# Patient Record
Sex: MALE | Race: ASIAN | NOT HISPANIC OR LATINO
[De-identification: names, ages, dates, MRNs, and addresses within clinical notes are randomized per-mention and may not be internally consistent; named-entity substitution may affect disease eponyms.]

---

## 2018-10-31 ENCOUNTER — APPOINTMENT (OUTPATIENT)
Dept: UROLOGY | Facility: CLINIC | Age: 78
End: 2018-10-31
Payer: MEDICARE

## 2018-10-31 VITALS — WEIGHT: 140 LBS | HEIGHT: 64 IN | BODY MASS INDEX: 23.9 KG/M2

## 2018-10-31 DIAGNOSIS — Z86.39 PERSONAL HISTORY OF OTHER ENDOCRINE, NUTRITIONAL AND METABOLIC DISEASE: ICD-10-CM

## 2018-10-31 DIAGNOSIS — R97.20 ELEVATED PROSTATE, SPECIFIC ANTIGEN [PSA]: ICD-10-CM

## 2018-10-31 PROCEDURE — 99204 OFFICE O/P NEW MOD 45 MIN: CPT

## 2019-12-13 ENCOUNTER — INPATIENT (INPATIENT)
Facility: HOSPITAL | Age: 79
LOS: 3 days | Discharge: HOME | DRG: 65 | End: 2019-12-17
Attending: INTERNAL MEDICINE | Admitting: INTERNAL MEDICINE
Payer: MEDICARE

## 2019-12-13 VITALS
DIASTOLIC BLOOD PRESSURE: 98 MMHG | SYSTOLIC BLOOD PRESSURE: 214 MMHG | RESPIRATION RATE: 16 BRPM | WEIGHT: 145.06 LBS | HEART RATE: 79 BPM | HEIGHT: 66 IN | OXYGEN SATURATION: 98 % | TEMPERATURE: 98 F

## 2019-12-13 DIAGNOSIS — J18.9 PNEUMONIA, UNSPECIFIED ORGANISM: ICD-10-CM

## 2019-12-13 DIAGNOSIS — Z29.9 ENCOUNTER FOR PROPHYLACTIC MEASURES, UNSPECIFIED: ICD-10-CM

## 2019-12-13 DIAGNOSIS — G93.40 ENCEPHALOPATHY, UNSPECIFIED: ICD-10-CM

## 2019-12-13 DIAGNOSIS — I10 ESSENTIAL (PRIMARY) HYPERTENSION: ICD-10-CM

## 2019-12-13 DIAGNOSIS — E11.9 TYPE 2 DIABETES MELLITUS WITHOUT COMPLICATIONS: ICD-10-CM

## 2019-12-13 DIAGNOSIS — J11.1 INFLUENZA DUE TO UNIDENTIFIED INFLUENZA VIRUS WITH OTHER RESPIRATORY MANIFESTATIONS: ICD-10-CM

## 2019-12-13 DIAGNOSIS — I63.9 CEREBRAL INFARCTION, UNSPECIFIED: ICD-10-CM

## 2019-12-13 DIAGNOSIS — E78.5 HYPERLIPIDEMIA, UNSPECIFIED: ICD-10-CM

## 2019-12-13 DIAGNOSIS — N18.9 CHRONIC KIDNEY DISEASE, UNSPECIFIED: ICD-10-CM

## 2019-12-13 LAB
ALBUMIN SERPL ELPH-MCNC: 3.6 G/DL — SIGNIFICANT CHANGE UP (ref 3.5–5)
ALP SERPL-CCNC: 90 U/L — SIGNIFICANT CHANGE UP (ref 40–120)
ALT FLD-CCNC: 37 U/L DA — SIGNIFICANT CHANGE UP (ref 10–60)
ANION GAP SERPL CALC-SCNC: 4 MMOL/L — LOW (ref 5–17)
APPEARANCE UR: CLEAR — SIGNIFICANT CHANGE UP
APTT BLD: 30.7 SEC — SIGNIFICANT CHANGE UP (ref 27.5–36.3)
AST SERPL-CCNC: 26 U/L — SIGNIFICANT CHANGE UP (ref 10–40)
BACTERIA # UR AUTO: NEGATIVE /HPF — SIGNIFICANT CHANGE UP
BASOPHILS # BLD AUTO: 0.04 K/UL — SIGNIFICANT CHANGE UP (ref 0–0.2)
BASOPHILS NFR BLD AUTO: 0.5 % — SIGNIFICANT CHANGE UP (ref 0–2)
BILIRUB SERPL-MCNC: 0.5 MG/DL — SIGNIFICANT CHANGE UP (ref 0.2–1.2)
BILIRUB UR-MCNC: NEGATIVE — SIGNIFICANT CHANGE UP
BUN SERPL-MCNC: 30 MG/DL — HIGH (ref 7–18)
CALCIUM SERPL-MCNC: 8.8 MG/DL — SIGNIFICANT CHANGE UP (ref 8.4–10.5)
CHLORIDE SERPL-SCNC: 105 MMOL/L — SIGNIFICANT CHANGE UP (ref 96–108)
CHOLEST SERPL-MCNC: 190 MG/DL — SIGNIFICANT CHANGE UP (ref 10–199)
CK MB BLD-MCNC: 2.7 % — SIGNIFICANT CHANGE UP (ref 0–3.5)
CK MB CFR SERPL CALC: 3.8 NG/ML — HIGH (ref 0–3.6)
CK MB CFR SERPL CALC: 4.2 NG/ML — HIGH (ref 0–3.6)
CK SERPL-CCNC: 142 U/L — SIGNIFICANT CHANGE UP (ref 35–232)
CO2 SERPL-SCNC: 29 MMOL/L — SIGNIFICANT CHANGE UP (ref 22–31)
COLOR SPEC: YELLOW — SIGNIFICANT CHANGE UP
CREAT SERPL-MCNC: 1.43 MG/DL — HIGH (ref 0.5–1.3)
DIFF PNL FLD: NEGATIVE — SIGNIFICANT CHANGE UP
EOSINOPHIL # BLD AUTO: 0.27 K/UL — SIGNIFICANT CHANGE UP (ref 0–0.5)
EOSINOPHIL NFR BLD AUTO: 3.5 % — SIGNIFICANT CHANGE UP (ref 0–6)
EPI CELLS # UR: NEGATIVE /HPF — SIGNIFICANT CHANGE UP
FLU A RESULT: DETECTED
FLU A RESULT: DETECTED
FLUAV AG NPH QL: DETECTED
FLUBV AG NPH QL: SIGNIFICANT CHANGE UP
GLUCOSE BLDC GLUCOMTR-MCNC: 160 MG/DL — HIGH (ref 70–99)
GLUCOSE BLDC GLUCOMTR-MCNC: 96 MG/DL — SIGNIFICANT CHANGE UP (ref 70–99)
GLUCOSE SERPL-MCNC: 132 MG/DL — HIGH (ref 70–99)
GLUCOSE UR QL: NEGATIVE — SIGNIFICANT CHANGE UP
HCT VFR BLD CALC: 47.4 % — SIGNIFICANT CHANGE UP (ref 39–50)
HDLC SERPL-MCNC: 42 MG/DL — SIGNIFICANT CHANGE UP
HGB BLD-MCNC: 15.3 G/DL — SIGNIFICANT CHANGE UP (ref 13–17)
IMM GRANULOCYTES NFR BLD AUTO: 0.1 % — SIGNIFICANT CHANGE UP (ref 0–1.5)
INR BLD: 1.1 RATIO — SIGNIFICANT CHANGE UP (ref 0.88–1.16)
KETONES UR-MCNC: NEGATIVE — SIGNIFICANT CHANGE UP
LEUKOCYTE ESTERASE UR-ACNC: NEGATIVE — SIGNIFICANT CHANGE UP
LIPID PNL WITH DIRECT LDL SERPL: 134 MG/DL — SIGNIFICANT CHANGE UP
LYMPHOCYTES # BLD AUTO: 2.33 K/UL — SIGNIFICANT CHANGE UP (ref 1–3.3)
LYMPHOCYTES # BLD AUTO: 30.2 % — SIGNIFICANT CHANGE UP (ref 13–44)
MAGNESIUM SERPL-MCNC: 2 MG/DL — SIGNIFICANT CHANGE UP (ref 1.6–2.6)
MCHC RBC-ENTMCNC: 29.1 PG — SIGNIFICANT CHANGE UP (ref 27–34)
MCHC RBC-ENTMCNC: 32.3 GM/DL — SIGNIFICANT CHANGE UP (ref 32–36)
MCV RBC AUTO: 90.1 FL — SIGNIFICANT CHANGE UP (ref 80–100)
MONOCYTES # BLD AUTO: 0.97 K/UL — HIGH (ref 0–0.9)
MONOCYTES NFR BLD AUTO: 12.6 % — SIGNIFICANT CHANGE UP (ref 2–14)
NEUTROPHILS # BLD AUTO: 4.1 K/UL — SIGNIFICANT CHANGE UP (ref 1.8–7.4)
NEUTROPHILS NFR BLD AUTO: 53.1 % — SIGNIFICANT CHANGE UP (ref 43–77)
NITRITE UR-MCNC: NEGATIVE — SIGNIFICANT CHANGE UP
NRBC # BLD: 0 /100 WBCS — SIGNIFICANT CHANGE UP (ref 0–0)
NT-PROBNP SERPL-SCNC: 125 PG/ML — SIGNIFICANT CHANGE UP (ref 0–450)
PH UR: 7 — SIGNIFICANT CHANGE UP (ref 5–8)
PLATELET # BLD AUTO: 280 K/UL — SIGNIFICANT CHANGE UP (ref 150–400)
POTASSIUM SERPL-MCNC: 4.1 MMOL/L — SIGNIFICANT CHANGE UP (ref 3.5–5.3)
POTASSIUM SERPL-SCNC: 4.1 MMOL/L — SIGNIFICANT CHANGE UP (ref 3.5–5.3)
PROT SERPL-MCNC: 7.9 G/DL — SIGNIFICANT CHANGE UP (ref 6–8.3)
PROT UR-MCNC: 30 MG/DL
PROTHROM AB SERPL-ACNC: 12.3 SEC — SIGNIFICANT CHANGE UP (ref 10–12.9)
RBC # BLD: 5.26 M/UL — SIGNIFICANT CHANGE UP (ref 4.2–5.8)
RBC # FLD: 12.8 % — SIGNIFICANT CHANGE UP (ref 10.3–14.5)
RBC CASTS # UR COMP ASSIST: NEGATIVE /HPF — SIGNIFICANT CHANGE UP (ref 0–2)
RSV RESULT: SIGNIFICANT CHANGE UP
RSV RNA RESP QL NAA+PROBE: SIGNIFICANT CHANGE UP
SODIUM SERPL-SCNC: 138 MMOL/L — SIGNIFICANT CHANGE UP (ref 135–145)
SP GR SPEC: 1 — LOW (ref 1.01–1.02)
TOTAL CHOLESTEROL/HDL RATIO MEASUREMENT: 4.5 RATIO — SIGNIFICANT CHANGE UP (ref 3.4–9.6)
TRIGL SERPL-MCNC: 72 MG/DL — SIGNIFICANT CHANGE UP (ref 10–149)
TROPONIN I SERPL-MCNC: <0.015 NG/ML — SIGNIFICANT CHANGE UP (ref 0–0.04)
TROPONIN I SERPL-MCNC: <0.015 NG/ML — SIGNIFICANT CHANGE UP (ref 0–0.04)
TSH SERPL-MCNC: 0.83 UU/ML — SIGNIFICANT CHANGE UP (ref 0.34–4.82)
UROBILINOGEN FLD QL: NEGATIVE — SIGNIFICANT CHANGE UP
WBC # BLD: 7.72 K/UL — SIGNIFICANT CHANGE UP (ref 3.8–10.5)
WBC # FLD AUTO: 7.72 K/UL — SIGNIFICANT CHANGE UP (ref 3.8–10.5)
WBC UR QL: SIGNIFICANT CHANGE UP /HPF (ref 0–5)

## 2019-12-13 PROCEDURE — 99223 1ST HOSP IP/OBS HIGH 75: CPT | Mod: GC

## 2019-12-13 PROCEDURE — 70498 CT ANGIOGRAPHY NECK: CPT | Mod: 26

## 2019-12-13 PROCEDURE — 71250 CT THORAX DX C-: CPT | Mod: 26

## 2019-12-13 PROCEDURE — 99285 EMERGENCY DEPT VISIT HI MDM: CPT

## 2019-12-13 PROCEDURE — 70551 MRI BRAIN STEM W/O DYE: CPT | Mod: 26

## 2019-12-13 PROCEDURE — 70496 CT ANGIOGRAPHY HEAD: CPT | Mod: 26

## 2019-12-13 PROCEDURE — 93010 ELECTROCARDIOGRAM REPORT: CPT

## 2019-12-13 PROCEDURE — 70450 CT HEAD/BRAIN W/O DYE: CPT | Mod: 26,59

## 2019-12-13 PROCEDURE — 71045 X-RAY EXAM CHEST 1 VIEW: CPT | Mod: 26

## 2019-12-13 PROCEDURE — 99221 1ST HOSP IP/OBS SF/LOW 40: CPT

## 2019-12-13 RX ORDER — CLOPIDOGREL BISULFATE 75 MG/1
75 TABLET, FILM COATED ORAL DAILY
Refills: 0 | Status: DISCONTINUED | OUTPATIENT
Start: 2019-12-13 | End: 2019-12-17

## 2019-12-13 RX ORDER — LOSARTAN/HYDROCHLOROTHIAZIDE 100MG-25MG
1 TABLET ORAL
Qty: 0 | Refills: 0 | DISCHARGE

## 2019-12-13 RX ORDER — NICARDIPINE HYDROCHLORIDE 30 MG/1
5 CAPSULE, EXTENDED RELEASE ORAL
Qty: 40 | Refills: 0 | Status: DISCONTINUED | OUTPATIENT
Start: 2019-12-13 | End: 2019-12-13

## 2019-12-13 RX ORDER — ASPIRIN/CALCIUM CARB/MAGNESIUM 324 MG
325 TABLET ORAL ONCE
Refills: 0 | Status: COMPLETED | OUTPATIENT
Start: 2019-12-13 | End: 2019-12-13

## 2019-12-13 RX ORDER — LABETALOL HCL 100 MG
10 TABLET ORAL ONCE
Refills: 0 | Status: COMPLETED | OUTPATIENT
Start: 2019-12-13 | End: 2019-12-13

## 2019-12-13 RX ORDER — ASPIRIN/CALCIUM CARB/MAGNESIUM 324 MG
81 TABLET ORAL DAILY
Refills: 0 | Status: DISCONTINUED | OUTPATIENT
Start: 2019-12-13 | End: 2019-12-17

## 2019-12-13 RX ORDER — CEFTRIAXONE 500 MG/1
1000 INJECTION, POWDER, FOR SOLUTION INTRAMUSCULAR; INTRAVENOUS EVERY 24 HOURS
Refills: 0 | Status: COMPLETED | OUTPATIENT
Start: 2019-12-13 | End: 2019-12-13

## 2019-12-13 RX ORDER — DEXTROSE 50 % IN WATER 50 %
25 SYRINGE (ML) INTRAVENOUS ONCE
Refills: 0 | Status: DISCONTINUED | OUTPATIENT
Start: 2019-12-13 | End: 2019-12-17

## 2019-12-13 RX ORDER — INSULIN LISPRO 100/ML
VIAL (ML) SUBCUTANEOUS
Refills: 0 | Status: DISCONTINUED | OUTPATIENT
Start: 2019-12-13 | End: 2019-12-17

## 2019-12-13 RX ORDER — AZITHROMYCIN 500 MG/1
500 TABLET, FILM COATED ORAL ONCE
Refills: 0 | Status: COMPLETED | OUTPATIENT
Start: 2019-12-13 | End: 2019-12-13

## 2019-12-13 RX ORDER — ATORVASTATIN CALCIUM 80 MG/1
1 TABLET, FILM COATED ORAL
Qty: 0 | Refills: 0 | DISCHARGE

## 2019-12-13 RX ORDER — METFORMIN HYDROCHLORIDE 850 MG/1
1 TABLET ORAL
Qty: 0 | Refills: 0 | DISCHARGE

## 2019-12-13 RX ORDER — AMLODIPINE BESYLATE 2.5 MG/1
1 TABLET ORAL
Qty: 0 | Refills: 0 | DISCHARGE

## 2019-12-13 RX ORDER — ATORVASTATIN CALCIUM 80 MG/1
40 TABLET, FILM COATED ORAL AT BEDTIME
Refills: 0 | Status: DISCONTINUED | OUTPATIENT
Start: 2019-12-13 | End: 2019-12-17

## 2019-12-13 RX ADMIN — Medication 75 MILLIGRAM(S): at 18:17

## 2019-12-13 RX ADMIN — CEFTRIAXONE 100 MILLIGRAM(S): 500 INJECTION, POWDER, FOR SOLUTION INTRAMUSCULAR; INTRAVENOUS at 16:51

## 2019-12-13 RX ADMIN — AZITHROMYCIN 255 MILLIGRAM(S): 500 TABLET, FILM COATED ORAL at 17:49

## 2019-12-13 RX ADMIN — Medication 10 MILLIGRAM(S): at 13:07

## 2019-12-13 RX ADMIN — Medication 325 MILLIGRAM(S): at 16:04

## 2019-12-13 RX ADMIN — CLOPIDOGREL BISULFATE 75 MILLIGRAM(S): 75 TABLET, FILM COATED ORAL at 18:17

## 2019-12-13 RX ADMIN — ATORVASTATIN CALCIUM 40 MILLIGRAM(S): 80 TABLET, FILM COATED ORAL at 21:58

## 2019-12-13 NOTE — H&P ADULT - NSHPREVIEWOFSYSTEMS_GEN_ALL_CORE
REVIEW OF SYSTEMS:    CONSTITUTIONAL: No weakness, fevers or chills  EYES/ENT: No visual changes;  No vertigo or throat pain   NECK: No pain or stiffness  RESPIRATORY: No cough, wheezing, hemoptysis; No shortness of breath  CARDIOVASCULAR: No chest pain or palpitations  GASTROINTESTINAL: No abdominal or epigastric pain. No nausea, vomiting, or hematemesis; No diarrhea or constipation. No melena or hematochezia.  GENITOURINARY: No dysuria, frequency or hematuria  NEUROLOGICAL: No numbness or weakness  SKIN: No itching, rashes REVIEW OF SYSTEMS:    CONSTITUTIONAL: + difficulty in speech  EYES/ENT: No visual changes;  No vertigo or throat pain   NECK: No pain or stiffness  RESPIRATORY: +cough  CARDIOVASCULAR: No chest pain or palpitations  GASTROINTESTINAL: No abdominal or epigastric pain. No nausea, vomiting, or hematemesis; No diarrhea or constipation. No melena or hematochezia.  GENITOURINARY: No dysuria, frequency or hematuria  NEUROLOGICAL: No numbness or weakness  SKIN: No itching, rashes

## 2019-12-13 NOTE — ED PROVIDER NOTE - CLINICAL SUMMARY MEDICAL DECISION MAKING FREE TEXT BOX
pt pw elevated bp and aphasia and dysarthria. cva vs hypertensive encephalopathy. I read ekg as sinus rhythm with 1st degree av block, rate 65, no st elevation or depression, qtc 409, normal axis, lvh criteria, narrow qrs. pt pw elevated bp and aphasia and dysarthria. cva vs hypertensive encephalopathy. I read ekg as sinus rhythm with 1st degree av block, rate 65, no st elevation or depression, qtc 409, normal axis, lvh criteria, narrow qrs. mri shows cva in the left mca territory. admit to medicine. aspirin therapy. maintain systolic bps as there are provided they do not exceed 220 pt pw elevated bp and aphasia and dysarthria. cva vs hypertensive encephalopathy. I read ekg as sinus rhythm with 1st degree av block, rate 65, no st elevation or depression, qtc 409, normal axis, lvh criteria, narrow qrs. mri shows cva in the left mca territory but the exam seems still more consistent with encephalopathy. either way, not tpa candidate given onset was at 4am. admit to medicine. aspirin therapy. maintain systolic bps as there are provided they do not exceed 220 pt pw elevated bp and aphasia and dysarthria. cva vs hypertensive encephalopathy. I read ekg as sinus rhythm with 1st degree av block, rate 65, no st elevation or depression, qtc 409, normal axis, lvh criteria, narrow qrs. mri shows cva in the left mca territory but the exam seems still more consistent with encephalopathy. either way, not tpa candidate given onset was at 4am. admit to medicine. aspirin therapy. maintain systolic bps as there are provided they do not exceed 220. chest xray consistent with community acquired pneumonia. will culture and start abx.

## 2019-12-13 NOTE — ED PROVIDER NOTE - CARE PLAN
Principal Discharge DX:	Ischemic stroke  Secondary Diagnosis:	Influenza A Principal Discharge DX:	Ischemic stroke  Secondary Diagnosis:	Influenza A  Secondary Diagnosis:	Hypertensive encephalopathy Principal Discharge DX:	Ischemic stroke  Secondary Diagnosis:	Influenza A  Secondary Diagnosis:	Hypertensive encephalopathy  Secondary Diagnosis:	CAP (community acquired pneumonia)

## 2019-12-13 NOTE — H&P ADULT - ATTENDING COMMENTS
Patient is a 79y old  Male who presents with a chief complaint of Aphasia. NIHSS score was 3. Patient was out of TPA window. He reports waking up with slurring of speech. Denies any weakness, numbness, difficulty in swallowing. He recently had dry cough but denies any fever, chills, malaise.     Seen and examined at bedside today  Has slurring of speech     REVIEW OF SYSTEMS: denies fever, chills, chest pain, abdominal pain, nausea, vomiting, diarrhea, constipation, dizziness    PHYSICAL EXAM:  GENERAL: NAD, well-groomed, well-developed  HEAD:  Atraumatic, Normocephalic  NERVOUS SYSTEM:  Alert & Oriented X3, Good concentration; CN grossly intact except slurring of speech. Motor Strength 5/5 B/L upper and lower extremities  CHEST/LUNG: Clear to auscultation bilaterally; No rales, rhonchi, wheezing, or rubs  HEART: Regular rate and rhythm; No murmurs, rubs, or gallops  ABDOMEN: Soft, Nontender, Nondistended; Bowel sounds present  EXTREMITIES:  2+ Peripheral Pulses, No clubbing, cyanosis, or edema  SKIN: No rashes or lesions  LABS:                        15.3   7.72  )-----------( 280      ( 13 Dec 2019 12:15 )             47.4     138  |  105  |  30<H>  ----------------------------<  132<H>  4.1   |  29  |  1.43<H>    Assessment and plan:  1. Acute left MCA cortical infarcts  MRI noted  CTA noted- mild narrowing of left MCA, severe stenosis of left ICA  Continue Aspirin, Plavix for 3 weeks and high intensity statin   Maintain permissive HTN for 48hrs   Spoke with Dr Reynaga   Speech and swallow eval   PT eval     2. Influenza  Cont Tamiflu  CT chest neg for consolidation  Will hold off antibiotics    3. HTN  Hold antihypertensive now- for permissive HTN    4. DM   ISS     5. Diet: Dysphagia diet, Lovenox for DVT prophylaxis, PT eval   Fall precaution Patient is a 79y old  Male who presents with a chief complaint of Aphasia. NIHSS score was 3. Patient was out of TPA window. He reports waking up with slurring of speech. Denies any weakness, numbness, difficulty in swallowing. He recently had dry cough but denies any fever, chills, malaise.     Seen and examined at bedside today  Has slurring of speech     REVIEW OF SYSTEMS: denies fever, chills, chest pain, abdominal pain, nausea, vomiting, diarrhea, constipation, dizziness    PHYSICAL EXAM:  GENERAL: NAD, well-groomed, well-developed  HEAD:  Atraumatic, Normocephalic  NERVOUS SYSTEM:  Alert & Oriented X3, Good concentration; CN grossly intact except slurring of speech. Motor Strength 5/5 B/L upper and lower extremities  CHEST/LUNG: Clear to auscultation bilaterally; No rales, rhonchi, wheezing, or rubs  HEART: Regular rate and rhythm; No murmurs, rubs, or gallops  ABDOMEN: Soft, Nontender, Nondistended; Bowel sounds present  EXTREMITIES:  2+ Peripheral Pulses, No clubbing, cyanosis, or edema  SKIN: No rashes or lesions  LABS:                        15.3   7.72  )-----------( 280      ( 13 Dec 2019 12:15 )             47.4     138  |  105  |  30<H>  ----------------------------<  132<H>  4.1   |  29  |  1.43<H>    Assessment and plan:  1. Acute left MCA cortical infarcts  MRI noted  CTA noted- mild narrowing of left MCA; severe stenosis of left ICA  Continue Aspirin, Plavix for 3 weeks and high intensity statin   No vascular surgery intervention needed emergently- Spoke with Dr Reynaga   Maintain permissive HTN for 48hrs   Cont Tele  TTE   Speech and swallow eval   PT eval     2. Influenza  Cont Tamiflu  CT chest neg for consolidation  Will hold off antibiotics    3. LASHELL  Cont Gentle hydration     4. Left ICA stenosis   Refer to vasc surgery     5. HTN  Hold antihypertensive now- for permissive HTN    6. DM   ISS     7. Diet: Dysphagia diet, Lovenox for DVT prophylaxis, PT eval   Aspiration, Fall and seizure precaution Patient is a 79y old  Male who presents with a chief complaint of Aphasia. NIHSS score was 3. Patient was out of TPA window. He reports waking up with slurring of speech. Denies any weakness, numbness, difficulty in swallowing. He recently had dry cough but denies any fever, chills, malaise.     Seen and examined at bedside today  Has slurring of speech     REVIEW OF SYSTEMS: denies fever, chills, chest pain, abdominal pain, nausea, vomiting, diarrhea, constipation, dizziness    PHYSICAL EXAM:  GENERAL: NAD, well-groomed, well-developed  HEAD:  Atraumatic, Normocephalic  NERVOUS SYSTEM:  Alert & Oriented X3, Good concentration; CN grossly intact except slurring of speech. Motor Strength 5/5 B/L upper and lower extremities  CHEST/LUNG: Clear to auscultation bilaterally; No rales, rhonchi, wheezing, or rubs  HEART: Regular rate and rhythm; No murmurs, rubs, or gallops  ABDOMEN: Soft, Nontender, Nondistended; Bowel sounds present  EXTREMITIES:  2+ Peripheral Pulses, No clubbing, cyanosis, or edema  SKIN: No rashes or lesions  LABS:                        15.3   7.72  )-----------( 280      ( 13 Dec 2019 12:15 )             47.4     138  |  105  |  30<H>  ----------------------------<  132<H>  4.1   |  29  |  1.43<H>    Assessment and plan:  1. Acute left MCA cortical infarcts  MRI noted  CTA noted- mild narrowing of left MCA; severe stenosis of left ICA  Continue Aspirin, Plavix for 3 weeks and high intensity statin   No vascular surgery intervention needed emergently- Spoke with Dr Reynaga   Maintain permissive HTN for 48hrs   Cont Tele  TTE   Speech and swallow eval   PT eval     2. Influenza  Cont Tamiflu  CT chest neg for consolidation  Will hold off antibiotics    3. LASHELL  Cont Gentle hydration     4. Left ICA stenosis   Refer to vasc surgery     5. HTN  Hold antihypertensive now- for permissive HTN    6. DM   ISS     7. Diet: Dysphagia diet, heparin SQ for DVT prophylaxis, PT eval   Aspiration, Fall and seizure precaution

## 2019-12-13 NOTE — H&P ADULT - HISTORY OF PRESENT ILLNESS
Pt is a 79m from home hx htn and dm pw aphasia and dysarthria. pt notes symptoms by his own volition at 4am this morning upon awakening. symptoms have been in and out. pt compliant with amlodipine and losartan and hctz. bp was elevates to the 200s systolic and son, who is er doc, gave additional dosage of those medications. patient denies numbness, focal weakness, cp, vomiting, diaphoresis. endorses cough, recent foreign travel. no fever, chills, rash, bleeding, vision loss, rhinorrhea Pt is a 79m from home with pmh of HTN, DM and hyperlipidemia who came to ED with c/o aphasia. Pt family at bedside giving details of all history as pt is having some speech difficulty. As per son pt started having difficulty in speech rebecca word finding difficulty that started around 4am. Symptoms waxed and waned pt never returned to baseline and was brought to ED. Pt is independent at baseline with no h/o of prior strokes/speech problem. Pt denies any headchae, weakness, chest pain, palpitations change in bowel or urinary habits. Pt also recently travelled and is also having cough past few days but no fever/chills reported  Pt is non smoker and non-alcoholic, lives at home with family, ambulated without any assistance at baseline

## 2019-12-13 NOTE — ED ADULT NURSE NOTE - OBJECTIVE STATEMENT
Slurred speech noted around 4AM with hypertension as per family. Patient A&O x 3 denies weakness, slurred speech at this time.

## 2019-12-13 NOTE — H&P ADULT - PROBLEM SELECTOR PLAN 5
c/w atorvastatin 40mg  f/u lipid profile in am Pt also found to be positive for Flu\  Droplet isolation and c/u tamiflu  No consolidation on CT chest  Holding abx  f/u blood cultures and monitor for fever

## 2019-12-13 NOTE — CONSULT NOTE ADULT - SUBJECTIVE AND OBJECTIVE BOX
HISTORY (from Admission H&P)    History of Present Illness: 	  Pt is a 79m from home with pmh of HTN, DM and hyperlipidemia who came to ED with c/o aphasia. Pt family at bedside giving details of all history as pt is having some speech difficulty. As per son pt started having difficulty in speech rebecca word finding difficulty that started around 4am. Symptoms waxed and waned pt never returned to baseline and was brought to ED. Pt is independent at baseline with no h/o of prior strokes/speech problem. Pt denies any headchae, weakness, chest pain, palpitations change in bowel or urinary habits. Pt also recently travelled and is also having cough past few days but no fever/chills reported  Pt is non smoker and non-alcoholic, lives at home with family, ambulated without any assistance at baseline     Review of Systems:  Review of Systems: REVIEW OF SYSTEMS:   CONSTITUTIONAL: + difficulty in speech  EYES/ENT: No visual changes;  No vertigo or throat pain   NECK: No pain or stiffness  RESPIRATORY: +cough  CARDIOVASCULAR: No chest pain or palpitations  GASTROINTESTINAL: No abdominal or epigastric pain. No nausea, vomiting, or hematemesis; No diarrhea or constipation. No melena or hematochezia.  GENITOURINARY: No dysuria, frequency or hematuria  NEUROLOGICAL: No numbness or weakness SKIN: No itching, rashes	      Allergies and Intolerances:        Allergies:  	No Known Allergies:     Home Medications:   * Patient Currently Takes Medications as of 13-Dec-2019 16:16 documented in Structured Notes  · 	losartan-hydrochlorothiazide 100mg-12.5mg oral tablet: Last Dose Taken:  , 1 tab(s) orally once a day  · 	atorvastatin 20 mg oral tablet: Last Dose Taken:  , 1 tab(s) orally once a day  · 	metFORMIN 500 mg oral tablet: Last Dose Taken:  , 1 tab(s) orally 2 times a day    Patient History:    Past Medical, Past Surgical, and Family History:  PAST MEDICAL HISTORY:  Carotid Artery Disease     Diabetes     HTN (hypertension).     PAST SURGICAL HISTORY:  History of CEA (carotid endarterectomy) 4 years ago on the R.     Social History:  Social History (marital status, living situation, occupation, tobacco use, alcohol and drug use, and sexual history): Non smoker, non alcoholic and no drug use	     Tobacco Screening:  · Core Measure Site	Yes	  · Has the patient used tobacco in the past 30 days?	No	    Risk Assessment:    Present on Admission:  Deep Venous Thrombosis	no	  Pulmonary Embolus	no	     Heart Failure:  Does this patient have a history of or has been diagnosed with heart failure? unknown.  [End of quoted History from Admission H&P]      Pt's sons, both physicians, at bedside.      Additional Hx, from sons.    Before today, Pt fluent in Polish and English, cognitively intact, physically active, walking normally.        EXAMINATION    Awake, alert, recumbent with torso partially elevated, on Gurney. HISTORY (from Admission H&P)    History of Present Illness: 	  Pt is a 79m from home with pmh of HTN, DM and hyperlipidemia who came to ED with c/o aphasia. Pt family at bedside giving details of all history as pt is having some speech difficulty. As per son pt started having difficulty in speech rebecca word finding difficulty that started around 4am. Symptoms waxed and waned pt never returned to baseline and was brought to ED. Pt is independent at baseline with no h/o of prior strokes/speech problem. Pt denies any headchae, weakness, chest pain, palpitations change in bowel or urinary habits. Pt also recently travelled and is also having cough past few days but no fever/chills reported  Pt is non smoker and non-alcoholic, lives at home with family, ambulated without any assistance at baseline     Review of Systems:  Review of Systems: REVIEW OF SYSTEMS:   CONSTITUTIONAL: + difficulty in speech  EYES/ENT: No visual changes;  No vertigo or throat pain   NECK: No pain or stiffness  RESPIRATORY: +cough  CARDIOVASCULAR: No chest pain or palpitations  GASTROINTESTINAL: No abdominal or epigastric pain. No nausea, vomiting, or hematemesis; No diarrhea or constipation. No melena or hematochezia.  GENITOURINARY: No dysuria, frequency or hematuria  NEUROLOGICAL: No numbness or weakness SKIN: No itching, rashes	      Allergies and Intolerances:        Allergies:  	No Known Allergies:     Home Medications:   * Patient Currently Takes Medications as of 13-Dec-2019 16:16 documented in Structured Notes  · 	losartan-hydrochlorothiazide 100mg-12.5mg oral tablet: Last Dose Taken:  , 1 tab(s) orally once a day  · 	atorvastatin 20 mg oral tablet: Last Dose Taken:  , 1 tab(s) orally once a day  · 	metFORMIN 500 mg oral tablet: Last Dose Taken:  , 1 tab(s) orally 2 times a day    Patient History:    Past Medical, Past Surgical, and Family History:  PAST MEDICAL HISTORY:  Carotid Artery Disease     Diabetes     HTN (hypertension).     PAST SURGICAL HISTORY:  History of CEA (carotid endarterectomy) 4 years ago on the R.     Social History:  Social History (marital status, living situation, occupation, tobacco use, alcohol and drug use, and sexual history): Non smoker, non alcoholic and no drug use	     Tobacco Screening:  · Core Measure Site	Yes	  · Has the patient used tobacco in the past 30 days?	No	    Risk Assessment:    Present on Admission:  Deep Venous Thrombosis	no	  Pulmonary Embolus	no	     Heart Failure:  Does this patient have a history of or has been diagnosed with heart failure? unknown.  [End of quoted History from Admission H&P]      Pt's sons, both physicians, at bedside.      Additional Hx, from sons.    Before today, Pt fluent in Syriac and English, cognitively intact, physically active, walking normally.  When son arrived at Pr's home early today  (went because of altered functioning) BP was very high; Pt given anti hypertensive meiucation then taken to ED.      CABG 2013.  R CEA 2009 (had episode of blurry visio; found with >75% stenosis      EXAMINATION    Awake, alert, recumbent with torso partially elevated, on Gurney.  Gives correct date but in fragments; initially gave month as "twelve" and after being queried says December.  Speech is halting, the impression being that he is searching for words. Names only two fingers: the thumb and littl Does not correctly repeat "no ifs ands or buts": despite examiner emphasizing the terminal /s/ sounds Pt does not repeat them.  Gives name of hospital as "[facility]  Does not provide a reason for being here.  Cognitive slowing.    PERRL; visual fields grossly intact to confrontation.  EOMS: does not follow finger or reflex hammer adequately well; follows face - range appears full.      Subtle L ADQ drift on suspension. Mild clumsiness of both hands (asymmetrically so).  Minimal mitgehen and motor perseveration.  ?Weakness of dorsiflexion of L hallux.  Otherwise grossly normal limb motor function.    LT grossly intact face, limbs, to unilateral stimuli and ti DSS.      Graphesthesia intact R fingertip; impaired L fingertips and palm.       Reflex                           Right    Left   Comment    Scapulohumeral               tr       tr  Pectoralis                        0       0  Biceps                            1       1  Triceps                           tr       tr  Fing flex                           0      0  Garcia                      absent  absent  Patellar                           3-      3-  Gastroc                           0       0  Plantar                        flexor   flexor    FNF eyes open/closed, and HKS intact bilat.    Extremely short-stepped gait.  Stable stance, eyes open, w feet  ~6 inches; mildly unsteady after closing lids.      From report of non-con head CT:    TECHNIQUE: CT of the head was performed without IV contrast.    COMPARISON: None.    FINDINGS:  There is no acute intracranial hemorrhage, parenchymal mass, mass effect   or midline shift. There is no hydrocephalus.    Possible hyperdense right MCA sign of the M1 segment of the right middle   cerebral artery and M2 branch on 4-34.    Nonspecific subcentimeter hypodensity left frontal lobe 4-46.    The cranium is intact. The visualized paranasal sinuses are well-aerated.    IMPRESSION:  No acute intracranial hemorrhage.    Nonspecific subcentimeter hypodensity left frontal lobe. Possible right   MCA sign. MRI exam recommended  to exclude acute ischemia.              From report of Head and Neck DTAs:    COMPARISON: None.    FINDINGS:  CTA of the neck:  Mild stenosis origin of right subclavian artery    The common carotid and internal carotid arteries are patent.   Atherosclerotic plaques are noted in the carotid bulbs. Calcified   atherosclerotic plaque noted at the origin of left internal carotid artery    Severe narrowing at the origin of left internal carotid artery.    The extracranial vertebral arteries are patent.    There is medial apposition of the vocal cords. Degenerative changes noted.    CTA Head:  Calcified atherosclerotic plaques cavernous segments and supraclinoid   internal carotid arteries resulting in narrowing.    The proximal anterior, middle and posterior cerebral arteries are patent.    Mild focal narrowing at the distal M1 segment of the right internal   carotid artery. M1 segment of the right middle cerebral artery is patent.    Mild narrowing distal M1 segment of left middle cerebral artery.   Narrowing at the proximal M2 segment of left middle cerebral artery    The intracranial vertebral and basilar arteries are patent. Calcified   atherosclerotic plaque V4 segment left distal vertebral artery resulting   in focal narrowing    There is no intracranial aneurysm.        IMPRESSION:  Severe (greater than 70%) narrowing at the origin of left internal   carotid artery.    Patent M1 segment of the right middle cerebral artery which appeared   hyperdense on the noncontrast exam.    Multifocal intracranial narrowing as described above.        From report of Brain MRI:      There is a tiny strip of sulcal diffusion restriction and matching   cytotoxic edema. It measures roughly 1.4 cm in length with adjacent 2 to   3 mm foci. There is no mass effect or hydrocephalus. There is a cavum   septum. There is mild central volume loss. There is no evidence of   previous parenchymal hemorrhage. The orbital and sellar contents and   cerebellar tonsils are within normal limits.    IMPRESSION:    Tiny acute bland cortical infarcts in distal left MCA branch distribution

## 2019-12-13 NOTE — CONSULT NOTE ADULT - ASSESSMENT
IMPRESSIONS    Imaging findings do not explain neurologic findings.  In particular the R parietal infarct does not account for language disturbance, confusion, etc., but probably explains impaired graphesthesia L hand.       Most likely: acute hypertensive encephalopathy.      Cerebral autoregulation curve most likely shifted therefore would allow some degree of hypertension.  The need here is to balance the need to treat BP sufficiently to manage hypertensive encephalopathy while avoiding risk of hypoperfusion from too agressive lowering of BP.  Recommend target range of 140-175 systolic,

## 2019-12-13 NOTE — H&P ADULT - PROBLEM SELECTOR PLAN 4
Pt also found to be positive for Flu\  Droplet isolation and c/u tamiflu  No consolidation on CT chest  Holding abx  f/u blood cultures and monitor for fever Pt has known CKD  Renal function at baseline

## 2019-12-13 NOTE — ED PROVIDER NOTE - OBJECTIVE STATEMENT
79m hx htn and dm pw aphasia and dysarthria. pt notes symptoms by his own volition at 4am this morning upon awakening. symptoms have been in and out. pt compliant with amlodipine and losartan and hctz. bp was elevates to the 200s systolic and son, who is er doc, gave additional dosage of those medications. patient denies numbness, focal weakness, cp, vomiting, diaphoresis. endorses cough, recent foreign travel. no fever, chills, rash, bleeding, vision loss, rhinorrhea  called in as code stroke  Fh and Sh not otherwise contributory  ROS otherwise negative

## 2019-12-13 NOTE — H&P ADULT - ASSESSMENT
Pt is a 79m from home with pmh of HTN, DM and hyperlipidemia who came to ED with c/o aphasia. Pt family at bedside giving details of all history as pt is having some speech difficulty. As per son pt started having difficulty in speech rebecca word finding difficulty that started around 4am. Symptoms waxed and waned pt never returned to baseline and was brought to ED. Pt is independent at baseline with no h/o of prior strokes/speech problem. Pt denies any headchae, weakness, chest pain, palpitations change in bowel or urinary habits. Pt also recently travelled and is also having cough past few days but no fever/chills reported  Pt is non smoker and non-alcoholic, lives at home with family, ambulated without any assistance at baseline.  In Ed pt vitals were  ICU Vital Signs Last 24 Hrs  T(C): 36.4 (13 Dec 2019 17:30), Max: 37.1 (13 Dec 2019 15:05)  T(F): 97.6 (13 Dec 2019 17:30), Max: 98.7 (13 Dec 2019 15:05)  HR: 64 (13 Dec 2019 17:30) (64 - 88)  BP: 172/658 (13 Dec 2019 17:30) (172/658 - 214/98)  BP(mean): --  ABP: --  ABP(mean): --  RR: 18 (13 Dec 2019 17:30) (16 - 18)  SpO2: 95% (13 Dec 2019 17:30) (95% - 98%)    Code stroke was called, CT head was negative, no tpa was given as pt was outside of window.

## 2019-12-13 NOTE — ED PROVIDER NOTE - PHYSICAL EXAMINATION
Gen: Alert, NAD  Head: NC, AT   Eyes: PERRL, EOMI, normal lids/conjunctiva  ENT: normal hearing, patent oropharynx without erythema/exudate, uvula midline  Neck: supple, no tenderness, Trachea midline  Pulm: Bilateral BS, normal resp effort, no wheeze/stridor/retractions  CV: RRR, no M/R/G, 2+ radial and dp pulses bl, no edema  Abd: soft, NT/ND, +BS, no hepatosplenomegaly  Mskel: extremities x4 with normal ROM and no joint effusions. no ctl spine ttp.   Skin: no rash, no bruising   Neuro: A+A oriented to place, person and time. got the month wrong initially though. has ,moderate aphasia and mild dysarthria. 5/5 motor bl ue and le. no sensory loss. finger to nose and heel to shin intact bl.

## 2019-12-13 NOTE — H&P ADULT - PROBLEM SELECTOR PLAN 3
Pt is on amlodipine and losartn-HCTZ 100/12.5 at home  Holding oral medications for permissive HTN  Monitor for Bp elevation more than 220/110

## 2019-12-14 LAB
24R-OH-CALCIDIOL SERPL-MCNC: 21 NG/ML — LOW (ref 30–80)
ALBUMIN SERPL ELPH-MCNC: 3 G/DL — LOW (ref 3.5–5)
ALP SERPL-CCNC: 72 U/L — SIGNIFICANT CHANGE UP (ref 40–120)
ALT FLD-CCNC: 28 U/L DA — SIGNIFICANT CHANGE UP (ref 10–60)
ANION GAP SERPL CALC-SCNC: 5 MMOL/L — SIGNIFICANT CHANGE UP (ref 5–17)
AST SERPL-CCNC: 24 U/L — SIGNIFICANT CHANGE UP (ref 10–40)
BILIRUB SERPL-MCNC: 0.5 MG/DL — SIGNIFICANT CHANGE UP (ref 0.2–1.2)
BUN SERPL-MCNC: 23 MG/DL — HIGH (ref 7–18)
CALCIUM SERPL-MCNC: 8.3 MG/DL — LOW (ref 8.4–10.5)
CHLORIDE SERPL-SCNC: 104 MMOL/L — SIGNIFICANT CHANGE UP (ref 96–108)
CO2 SERPL-SCNC: 29 MMOL/L — SIGNIFICANT CHANGE UP (ref 22–31)
CREAT SERPL-MCNC: 1.44 MG/DL — HIGH (ref 0.5–1.3)
GLUCOSE BLDC GLUCOMTR-MCNC: 116 MG/DL — HIGH (ref 70–99)
GLUCOSE BLDC GLUCOMTR-MCNC: 142 MG/DL — HIGH (ref 70–99)
GLUCOSE BLDC GLUCOMTR-MCNC: 150 MG/DL — HIGH (ref 70–99)
GLUCOSE BLDC GLUCOMTR-MCNC: 181 MG/DL — HIGH (ref 70–99)
GLUCOSE SERPL-MCNC: 120 MG/DL — HIGH (ref 70–99)
HCT VFR BLD CALC: 44.5 % — SIGNIFICANT CHANGE UP (ref 39–50)
HGB BLD-MCNC: 14.6 G/DL — SIGNIFICANT CHANGE UP (ref 13–17)
MCHC RBC-ENTMCNC: 29.8 PG — SIGNIFICANT CHANGE UP (ref 27–34)
MCHC RBC-ENTMCNC: 32.8 GM/DL — SIGNIFICANT CHANGE UP (ref 32–36)
MCV RBC AUTO: 90.8 FL — SIGNIFICANT CHANGE UP (ref 80–100)
NRBC # BLD: 0 /100 WBCS — SIGNIFICANT CHANGE UP (ref 0–0)
PLATELET # BLD AUTO: 267 K/UL — SIGNIFICANT CHANGE UP (ref 150–400)
POTASSIUM SERPL-MCNC: 4.2 MMOL/L — SIGNIFICANT CHANGE UP (ref 3.5–5.3)
POTASSIUM SERPL-SCNC: 4.2 MMOL/L — SIGNIFICANT CHANGE UP (ref 3.5–5.3)
PROT SERPL-MCNC: 6.8 G/DL — SIGNIFICANT CHANGE UP (ref 6–8.3)
RBC # BLD: 4.9 M/UL — SIGNIFICANT CHANGE UP (ref 4.2–5.8)
RBC # FLD: 12.8 % — SIGNIFICANT CHANGE UP (ref 10.3–14.5)
SODIUM SERPL-SCNC: 138 MMOL/L — SIGNIFICANT CHANGE UP (ref 135–145)
WBC # BLD: 6.67 K/UL — SIGNIFICANT CHANGE UP (ref 3.8–10.5)
WBC # FLD AUTO: 6.67 K/UL — SIGNIFICANT CHANGE UP (ref 3.8–10.5)

## 2019-12-14 PROCEDURE — 99233 SBSQ HOSP IP/OBS HIGH 50: CPT

## 2019-12-14 PROCEDURE — 99232 SBSQ HOSP IP/OBS MODERATE 35: CPT

## 2019-12-14 RX ADMIN — Medication 75 MILLIGRAM(S): at 06:09

## 2019-12-14 RX ADMIN — Medication 81 MILLIGRAM(S): at 11:56

## 2019-12-14 RX ADMIN — ATORVASTATIN CALCIUM 40 MILLIGRAM(S): 80 TABLET, FILM COATED ORAL at 21:58

## 2019-12-14 RX ADMIN — CLOPIDOGREL BISULFATE 75 MILLIGRAM(S): 75 TABLET, FILM COATED ORAL at 11:56

## 2019-12-14 RX ADMIN — Medication 75 MILLIGRAM(S): at 17:30

## 2019-12-14 NOTE — PROGRESS NOTE ADULT - SUBJECTIVE AND OBJECTIVE BOX
NOTE FOR 19    Patient is a 79y old  Male who presents with a chief complaint of Aphasia (15 Dec 2019 10:11)    HPI:  Pt is a 79m from home with pmh of HTN, DM and hyperlipidemia who came to ED with c/o aphasia. Pt family at bedside giving details of all history as pt is having some speech difficulty. As per son pt started having difficulty in speech rebecca word finding difficulty that started around 4am. Symptoms waxed and waned pt never returned to baseline and was brought to ED. Pt is independent at baseline with no h/o of prior strokes/speech problem. Pt denies any headchae, weakness, chest pain, palpitations change in bowel or urinary habits. Pt also recently travelled and is also having cough past few days but no fever/chills reported  Pt is non smoker and non-alcoholic, lives at home with family, ambulated without any assistance at baseline (13 Dec 2019 16:17)    Pt is not in his room.     PAST MEDICAL & SURGICAL HISTORY:  Diabetes  Carotid Artery Disease  HTN (hypertension)  History of CEA (carotid endarterectomy): 4 years ago on the R    MEDICATIONS  (STANDING):  aspirin  chewable 81 milliGRAM(s) Oral daily  atorvastatin 40 milliGRAM(s) Oral at bedtime  clopidogrel Tablet 75 milliGRAM(s) Oral daily  dextrose 50% Injectable 25 Gram(s) IV Push once  dextrose 50% Injectable 25 Gram(s) IV Push once  insulin lispro (HumaLOG) corrective regimen sliding scale   SubCutaneous three times a day before meals  oseltamivir 75 milliGRAM(s) Oral two times a day    MEDICATIONS  (PRN):      EXAM:  Vital Signs Last 24 Hrs  T(C): 36.9 (15 Dec 2019 07:33), Max: 36.9 (14 Dec 2019 11:18)  T(F): 98.5 (15 Dec 2019 07:33), Max: 98.5 (14 Dec 2019 11:18)  HR: 58 (15 Dec 2019 07:33) (56 - 73)  BP: 167/75 (15 Dec 2019 07:33) (160/64 - 179/74)  BP(mean): --  RR: 16 (15 Dec 2019 07:33) (16 - 18)  SpO2: 97% (15 Dec 2019 07:33) (95% - 100%)    - @ 07:01  -  12-15 @ 07:00  --------------------------------------------------------  IN: 200 mL / OUT: 0 mL / NET: 200 mL    12-15 @ 07:01  -  12-15 @ 10:50  --------------------------------------------------------  IN: 200 mL / OUT: 0 mL / NET: 200 mL        PHYSICAL EXAM:  Pt is not present.      LABS:  Urinalysis Basic - ( 13 Dec 2019 13:39 )    Color: Yellow / Appearance: Clear / S.005 / pH: x  Gluc: x / Ketone: Negative  / Bili: Negative / Urobili: Negative   Blood: x / Protein: 30 mg/dL / Nitrite: Negative   Leuk Esterase: Negative / RBC: Negative /HPF / WBC 0-2 /HPF   Sq Epi: x / Non Sq Epi: Negative /HPF / Bacteria: Negative /HPF      PT/INR - ( 13 Dec 2019 12:15 )   PT: 12.3 sec;   INR: 1.10 ratio         PTT - ( 13 Dec 2019 12:15 )  PTT:30.7 sec  LIVER FUNCTIONS - ( 14 Dec 2019 07:28 )  Alb: 3.0 g/dL / Pro: 6.8 g/dL / ALK PHOS: 72 U/L / ALT: 28 U/L DA / AST: 24 U/L / GGT: x                                 14.6   6.67  )-----------( 267      ( 14 Dec 2019 07:28 )             44.5     12    138  |  104  |  23<H>  ----------------------------<  120<H>  4.2   |  29  |  1.44<H>    Ca    8.3<L>      14 Dec 2019 07:28  Mg     2.0         TPro  6.8  /  Alb  3.0<L>  /  TBili  0.5  /  DBili  x   /  AST  24  /  ALT  28  /  AlkPhos  72      CARDIAC MARKERS ( 13 Dec 2019 16:49 )  <0.015 ng/mL / x     / 142 U/L / x     / 3.8 ng/mL  CARDIAC MARKERS ( 13 Dec 2019 12:15 )  <0.015 ng/mL / x     / x     / x     / 4.2 ng/mL

## 2019-12-14 NOTE — PROGRESS NOTE ADULT - ASSESSMENT
Acute ischemic infarct left frontal lobe; non-fluent dysphasia; left carotid artery stenosis >70%. Focal stenosis left and right M1 and M2 segments of bilateral MCA  Plan continue aspirin clopidogrel; maintain -130 mm/Hg  continue plans to investigate potential embolic source in the heart even though there is a potential source of artery-to-artery embolism due to left ICA narrowing;   speech and language therapy;   atorvastatin   DVT prophylaxis

## 2019-12-14 NOTE — PROGRESS NOTE ADULT - ASSESSMENT
Pt is a 79m from home with pmh of HTN, DM and hyperlipidemia who came to ED with c/o aphasia. Pt family at bedside giving details of all history as pt is having some speech difficulty. As per son pt started having difficulty in speech rebecca word finding difficulty that started around 4am. Symptoms waxed and waned pt never returned to baseline and was brought to ED. Pt is independent at baseline with no h/o of prior strokes/speech problem. Pt denies any headchae, weakness, chest pain, palpitations change in bowel or urinary habits. Pt also recently travelled and is also having cough past few days but no fever/chills reported  Pt is non smoker and non-alcoholic, lives at home with family, ambulated without any assistance at baseline (13 Dec 2019 16:17)    1. Acute left MCA cortical infarcts  MRI noted. Possible CLARITZA. Cardio to consult for embolic source. Maintain on telemetry.   CTA noted- mild narrowing of left MCA; severe stenosis of left ICA  Continue Aspirin, Plavix for 3 weeks and high intensity statin   TTE   Speech and swallow eval   PT eval     2. Influenza  Cont Tamiflu  CT chest neg for consolidation  Will hold off antibiotics    3. LASHELL  Cont Gentle hydration     4. Left ICA stenosis   Refer to vasc surgery     5. HTN  Hold antihypertensive    6. DM   ISS     7. Diet: Dysphagia diet, heparin SQ for DVT prophylaxis, PT eval   Aspiration, Fall and seizure precaution.

## 2019-12-14 NOTE — PROGRESS NOTE ADULT - SUBJECTIVE AND OBJECTIVE BOX
INTERVAL HPI/OVERNIGHT EVENTS: Continues to experience difficulty finding words    HEALTH ISSUES - PROBLEM Dx:  CKD (chronic kidney disease): CKD (chronic kidney disease)  Hyperlipidemia: Hyperlipidemia  Prophylactic measure: Prophylactic measure  Pneumonia: Pneumonia  Influenza: Influenza  Hypertension: Hypertension  Diabetes: Diabetes  CVA (cerebral vascular accident): CVA (cerebral vascular accident)            MEDICATIONS  (STANDING):  aspirin  chewable 81 milliGRAM(s) Oral daily  atorvastatin 40 milliGRAM(s) Oral at bedtime  clopidogrel Tablet 75 milliGRAM(s) Oral daily  dextrose 50% Injectable 25 Gram(s) IV Push once  dextrose 50% Injectable 25 Gram(s) IV Push once  insulin lispro (HumaLOG) corrective regimen sliding scale   SubCutaneous three times a day before meals  oseltamivir 75 milliGRAM(s) Oral two times a day    MEDICATIONS  (PRN):      Allergies    No Known Allergies    Intolerances          REVIEW OF SYSTEMS:    CONSTITUTIONAL: No weakness, fatigue, malaise, fevers or chills, no weight change, appetite change  EYES: No visual changes; No double vision,  No vertigo, eye pain  Ears: no otalgia, no otorhea, no hearing loss, tinnitus  Nose: no epistaxis, rhinorrhea, post-discharge, sinus pressure  Throat: no throat pain, no oral lesions, tooth pain   NECK: No pain or stiffness  RESPIRATORY: No cough (productive or dry), wheezing, hemoptysis; No shortness of breath, orthnopnea, PND, CAN, snoring,  CARDIOVASCULAR: No chest pain or palpitations, no leg edema, no claudication    GASTROINTESTINAL: No abdominal or epigastric pain. No nausea, vomiting, or hematemesis; No diarrhea or constipation. No melena or hematochezia.  GENITOURINARY: No dysuria, frequency, urgency or hematuria, no pelvic pain, urinary incontinence, urgency  Muscloskeletal: no joints or muscle pain, no swelling in joints or muscles  NEUROLOGICAL: No numbness or weakness, headache, memory loss, seizures, dizziness, vertigo, syncope, ataxia  SKIN: No pruritis, rashes, lesions or new moles  Psych: No anxiety, sadness, insomnia, suicide thoughts  Endocrine: No Heat or Cold intolerance, polydipsia, polyphagia  Heme/Lymph: no LN enlargement, no easy bruising or bleeding       Vital Signs Last 24 Hrs  T(C): 36.9 (14 Dec 2019 11:18), Max: 37.1 (13 Dec 2019 15:05)  T(F): 98.5 (14 Dec 2019 11:18), Max: 98.8 (13 Dec 2019 23:49)  HR: 57 (14 Dec 2019 13:31) (54 - 88)  BP: 163/73 (14 Dec 2019 13:31) (145/54 - 176/63)  BP(mean): --  RR: 18 (14 Dec 2019 11:18) (16 - 18)  SpO2: 98% (14 Dec 2019 11:18) (95% - 98%)    PHYSICAL EXAM:    Alert, awake, nonfluent, impaired naming, repetition and normal comprehension, Pupils are equal and reactive to light and accomodation. Visual fields are full by confrontation testing. Funduscopy reveals normal discs and retina. Extraocular movements are normal without nystagmus. Facial sensations, jaw strength, facial movements, hearing, palate, neck, shoulder and tongue are normal and symmetrical. Sensation for touch, pin, temperature and vibration, position sense are normal and symmetrical in the extremities and /or the trunk. Tone is normal in the extremities. Strength is 5/5. Muscle spindle reflexes (DTR) are symmetrical 1 in knee absent in the ankles and 1+ at biceps triceps. Plantar responses are flexor. Finger-to-nose and heel-to-shin are normal. Romberg sign is normal;  Tandem gait is unsteady .        LABS:                        14.6   6.67  )-----------( 267      ( 14 Dec 2019 07:28 )             44.5     12-14    138  |  104  |  23<H>  ----------------------------<  120<H>  4.2   |  29  |  1.44<H>    Ca    8.3<L>      14 Dec 2019 07:28  Mg     2.0     12-13    TPro  6.8  /  Alb  3.0<L>  /  TBili  0.5  /  DBili  x   /  AST  24  /  ALT  28  /  AlkPhos  72  12-14    PT/INR - ( 13 Dec 2019 12:15 )   PT: 12.3 sec;   INR: 1.10 ratio         PTT - ( 13 Dec 2019 12:15 )  PTT:30.7 sec  Urinalysis Basic - ( 13 Dec 2019 13:39 )    Color: Yellow / Appearance: Clear / S.005 / pH: x  Gluc: x / Ketone: Negative  / Bili: Negative / Urobili: Negative   Blood: x / Protein: 30 mg/dL / Nitrite: Negative   Leuk Esterase: Negative / RBC: Negative /HPF / WBC 0-2 /HPF   Sq Epi: x / Non Sq Epi: Negative /HPF / Bacteria: Negative /HPF        RADIOLOGY & ADDITIONAL TESTS:  < from: MR Head No Cont (19 @ 15:08) >    EXAM:  MR BRAIN                            PROCEDURE DATE:  2019          INTERPRETATION:  MRI brain without contrast    History CVA    Comparison CT performed the same day    There is a tiny strip of sulcal diffusion restriction and matching  cytotoxic edema. It measures roughly 1.4 cm in length with adjacent 2 to   3 mm foci. There is no mass effect or hydrocephalus. There is a cavum   septum. There is mild central volume loss. There is no evidence of   previous parenchymal hemorrhage.The orbital and sellar contents and   cerebellar tonsils are within normal limits.    IMPRESSION:    Tiny acute bland cortical infarcts in distal left MCA branch distribution    LEORA BATISTA M.D., ATTENDING RADIOLOGIST  This document has been electronically signed. Dec 13 2019  3:04PM    < end of copied text >  < from: CT Angio Head w/ IV Cont (19 @ 12:58) >  EXAM:  CT ANGIO NECK (W)AW IC                          EXAM:  CT ANGIO BRAIN (W)AW IC                            PROCEDURE DATE:  2019          INTERPRETATION:  CLINICAL STATEMENT: Aphasia. CVA    TECHNIQUE: CTA of the head and neck performedwith IV contrast.  3-D MIP   imaging obtained. Approximately 90 cc of Omnipaque 350 administered.    COMPARISON: None.    FINDINGS:  CTA of the neck:  Mild stenosis origin of right subclavian artery    The common carotid and internal carotid arteriesare patent.   Atherosclerotic plaques are noted in the carotid bulbs. Calcified   atherosclerotic plaque noted at the origin of left internal carotid artery    Severe narrowing at the origin of left internal carotid artery.    The extracranial vertebral arteries are patent.    There is medial apposition of the vocal cords. Degenerative changes noted.    CTA Head:  Calcified atherosclerotic plaques cavernous segments and supraclinoid   internal carotid arteries resulting in narrowing.    The proximal anterior, middle and posterior cerebral arteries are patent.    Mild focal narrowing at the distal M1 segment of the right internal   carotid artery. M1 segment of the right middle cerebral artery is patent.    Mild narrowing distal M1 segment of left middle cerebral artery.   Narrowing at the proximal M2 segment of left middle cerebral artery    The intracranial vertebral and basilar arteries are patent. Calcified   atherosclerotic plaque V4 segment left distal vertebral artery resulting   infocal narrowing    There is no intracranial aneurysm.        IMPRESSION:  Severe (greater than 70%) narrowing at the origin of left internal   carotid artery.    Patent M1 segment of the right middle cerebral artery which appeared   hyperdense on th enoncontrast exam.    Multifocal intracranial narrowing as described above      KAYLEIGH REDDING M.D., ATTENDING RADIOLOGIST  This document has been electronically signed. Dec 13 2019  1:34PM    < end of copied text >

## 2019-12-14 NOTE — PHYSICAL THERAPY INITIAL EVALUATION ADULT - GENERAL OBSERVATIONS, REHAB EVAL
pt isolation observe, aox4 improving speech, acute L bland mca infarct, negative robby deficits, oob chair, mask protocol applied, fairly I adls locomotion without gait deviation

## 2019-12-15 LAB
ANION GAP SERPL CALC-SCNC: 2 MMOL/L — LOW (ref 5–17)
BUN SERPL-MCNC: 28 MG/DL — HIGH (ref 7–18)
CALCIUM SERPL-MCNC: 8.6 MG/DL — SIGNIFICANT CHANGE UP (ref 8.4–10.5)
CHLORIDE SERPL-SCNC: 103 MMOL/L — SIGNIFICANT CHANGE UP (ref 96–108)
CO2 SERPL-SCNC: 32 MMOL/L — HIGH (ref 22–31)
CREAT SERPL-MCNC: 1.47 MG/DL — HIGH (ref 0.5–1.3)
GLUCOSE BLDC GLUCOMTR-MCNC: 126 MG/DL — HIGH (ref 70–99)
GLUCOSE BLDC GLUCOMTR-MCNC: 129 MG/DL — HIGH (ref 70–99)
GLUCOSE BLDC GLUCOMTR-MCNC: 133 MG/DL — HIGH (ref 70–99)
GLUCOSE BLDC GLUCOMTR-MCNC: 176 MG/DL — HIGH (ref 70–99)
GLUCOSE SERPL-MCNC: 202 MG/DL — HIGH (ref 70–99)
HBA1C BLD-MCNC: 6.9 % — HIGH (ref 4–5.6)
HCT VFR BLD CALC: 48 % — SIGNIFICANT CHANGE UP (ref 39–50)
HGB BLD-MCNC: 15.5 G/DL — SIGNIFICANT CHANGE UP (ref 13–17)
MCHC RBC-ENTMCNC: 29.5 PG — SIGNIFICANT CHANGE UP (ref 27–34)
MCHC RBC-ENTMCNC: 32.3 GM/DL — SIGNIFICANT CHANGE UP (ref 32–36)
MCV RBC AUTO: 91.3 FL — SIGNIFICANT CHANGE UP (ref 80–100)
NRBC # BLD: 0 /100 WBCS — SIGNIFICANT CHANGE UP (ref 0–0)
PLATELET # BLD AUTO: 254 K/UL — SIGNIFICANT CHANGE UP (ref 150–400)
POTASSIUM SERPL-MCNC: 3.9 MMOL/L — SIGNIFICANT CHANGE UP (ref 3.5–5.3)
POTASSIUM SERPL-SCNC: 3.9 MMOL/L — SIGNIFICANT CHANGE UP (ref 3.5–5.3)
RBC # BLD: 5.26 M/UL — SIGNIFICANT CHANGE UP (ref 4.2–5.8)
RBC # FLD: 12.5 % — SIGNIFICANT CHANGE UP (ref 10.3–14.5)
SODIUM SERPL-SCNC: 137 MMOL/L — SIGNIFICANT CHANGE UP (ref 135–145)
WBC # BLD: 7.1 K/UL — SIGNIFICANT CHANGE UP (ref 3.8–10.5)
WBC # FLD AUTO: 7.1 K/UL — SIGNIFICANT CHANGE UP (ref 3.8–10.5)

## 2019-12-15 PROCEDURE — 99233 SBSQ HOSP IP/OBS HIGH 50: CPT | Mod: GC

## 2019-12-15 RX ORDER — LABETALOL HCL 100 MG
10 TABLET ORAL ONCE
Refills: 0 | Status: COMPLETED | OUTPATIENT
Start: 2019-12-15 | End: 2019-12-15

## 2019-12-15 RX ORDER — AMLODIPINE BESYLATE 2.5 MG/1
5 TABLET ORAL DAILY
Refills: 0 | Status: DISCONTINUED | OUTPATIENT
Start: 2019-12-15 | End: 2019-12-16

## 2019-12-15 RX ADMIN — Medication 75 MILLIGRAM(S): at 05:50

## 2019-12-15 RX ADMIN — Medication 10 MILLIGRAM(S): at 17:37

## 2019-12-15 RX ADMIN — ATORVASTATIN CALCIUM 40 MILLIGRAM(S): 80 TABLET, FILM COATED ORAL at 22:21

## 2019-12-15 RX ADMIN — AMLODIPINE BESYLATE 5 MILLIGRAM(S): 2.5 TABLET ORAL at 13:36

## 2019-12-15 RX ADMIN — Medication 81 MILLIGRAM(S): at 12:20

## 2019-12-15 RX ADMIN — Medication 1: at 12:20

## 2019-12-15 RX ADMIN — CLOPIDOGREL BISULFATE 75 MILLIGRAM(S): 75 TABLET, FILM COATED ORAL at 12:20

## 2019-12-15 RX ADMIN — Medication 75 MILLIGRAM(S): at 17:35

## 2019-12-15 NOTE — PROGRESS NOTE ADULT - SUBJECTIVE AND OBJECTIVE BOX
PGY1 Note discussed with supervising resident and primary attending.    Patient is a 79y old  Male who presents with a chief complaint of Aphasia (14 Dec 2019 10:52)      INTERVAL HPI/OVERNIGHT EVENTS:  Pt seen and examined at bedside  word finding difficulty noted.   Discussed clinical course with patient and son  As per neuro, complete cardiac embolic workup.  Possible source in L ICA stenosis. As per patient he previously had surgery on R ICA about 8 years ago.  Vascular consult     MEDICATIONS  (STANDING):  aspirin  chewable 81 milliGRAM(s) Oral daily  atorvastatin 40 milliGRAM(s) Oral at bedtime  clopidogrel Tablet 75 milliGRAM(s) Oral daily  dextrose 50% Injectable 25 Gram(s) IV Push once  dextrose 50% Injectable 25 Gram(s) IV Push once  insulin lispro (HumaLOG) corrective regimen sliding scale   SubCutaneous three times a day before meals  oseltamivir 75 milliGRAM(s) Oral two times a day    MEDICATIONS  (PRN):      Allergies    No Known Allergies    Intolerances        REVIEW OF SYSTEMS:  CONSTITUTIONAL: No fever, weight loss, or fatigue  RESPIRATORY: No cough, wheezing, chills or hemoptysis; No shortness of breath  CARDIOVASCULAR: No chest pain, palpitations, dizziness, or leg swelling  GASTROINTESTINAL: No abdominal or epigastric pain. No nausea, vomiting, or hematemesis; No diarrhea or constipation. No melena or hematochezia.  NEUROLOGICAL: No headaches, memory loss, loss of strength, numbness, or tremors  SKIN: No itching, burning, rashes, or lesions     Vital Signs Last 24 Hrs  T(C): 36.9 (15 Dec 2019 07:33), Max: 36.9 (14 Dec 2019 11:18)  T(F): 98.5 (15 Dec 2019 07:33), Max: 98.5 (14 Dec 2019 11:18)  HR: 58 (15 Dec 2019 07:33) (56 - 73)  BP: 167/75 (15 Dec 2019 07:33) (160/64 - 179/74)  BP(mean): --  RR: 16 (15 Dec 2019 07:33) (16 - 18)  SpO2: 97% (15 Dec 2019 07:33) (95% - 100%)    PHYSICAL EXAM:  GENERAL: NAD, well-groomed, well-developed  HEAD:  Atraumatic, Normocephalic  EYES: EOMI, PERRLA, conjunctiva and sclera clear  NECK: Supple, No JVD, Normal thyroid  CHEST/LUNG: Clear to percussion bilaterally; No rales, rhonchi, wheezing, or rubs  HEART: Regular rate and rhythm; No murmurs, rubs, or gallops  ABDOMEN: Soft, Nontender, Nondistended; Bowel sounds present  NERVOUS SYSTEM:  Alert & Oriented X3, Good concentration; nonfluent speech with naming difficulty. Motor Strength 5/5 B/L   EXTREMITIES:  2+ Peripheral Pulses, No clubbing, cyanosis, or edema      LABS:                                   14.6   6.67  )-----------( 267      ( 14 Dec 2019 07:28 )             44.5       12-14    138  |  104  |  23<H>  ----------------------------<  120<H>  4.2   |  29  |  1.44<H>    Ca    8.3<L>      14 Dec 2019 07:28  Mg     2.0     12-13    TPro  6.8  /  Alb  3.0<L>  /  TBili  0.5  /  DBili  x   /  AST  24  /  ALT  28  /  AlkPhos  72  12-14          CAPILLARY BLOOD GLUCOSE      POCT Blood Glucose.: 133 mg/dL (15 Dec 2019 07:44)  POCT Blood Glucose.: 181 mg/dL (14 Dec 2019 21:34)  POCT Blood Glucose.: 142 mg/dL (14 Dec 2019 16:58)  POCT Blood Glucose.: 150 mg/dL (14 Dec 2019 11:47)      RADIOLOGY & ADDITIONAL TESTS:  < from: MR Head No Cont (12.13.19 @ 15:08) >  IMPRESSION:    Tiny acute bland cortical infarcts in distal left MCA branch distribution        LEORA BATISTA M.D., ATTENDING RADIOLOGIST  This document has been electronically signed. Dec 13 2019  3:04PM    < end of copied text >    Imaging Personally Reviewed:  [ ] YES  [ ] NO    Consultant(s) Notes Reviewed:  [ ] YES  [ ] NO

## 2019-12-15 NOTE — PROGRESS NOTE ADULT - PROBLEM SELECTOR PLAN 3
Pt is on amlodipine and losartn-HCTZ 100/12.5 at home  Slowly resume BP meds as past permissive period  Resume amlodipine today with parameters.

## 2019-12-15 NOTE — PROGRESS NOTE ADULT - PROBLEM SELECTOR PLAN 1
Pt came in with difficulty speech, slow processing of information  Pt appears to have acute B/L parietal cva as noted in MRI  Bp elevated to 200's on admission  Code stroke called and no tpa as outside window  Pt also underwent CTA that was significant for severe L ICA stenosis, no complete occlusion/thrombosis noted  MR showed 2 small cortical infarcts in distal L MCA territory  Pt started on aspirin, plavix and high intensity statin  Pt passed bedside dysphagia screen  Neuro check q 4 hours  resume BP meds  PRN medication for BP above range  Stat CT head for change in mental status  f/u lipid profile, PT  Monitor on tele and follow echocardiogram  Neuro Dr Reynaga on board  Possible source in L ICA stenosis. Will need vascular eval  Complate cardiac workup to r/o afib. No afib seen on tele Pt came in with difficulty speech, slow processing of information  Pt appears to have acute B/L parietal cva as noted in MRI  Bp elevated to 200's on admission  Code stroke called and no tpa as outside window  Pt also underwent CTA that was significant for severe L ICA stenosis, no complete occlusion/thrombosis noted  MR showed 2 small cortical infarcts in distal L MCA territory  Pt started on aspirin, plavix and high intensity statin  Pt passed bedside dysphagia screen  Neuro check q 4 hours  resume BP meds  PRN medication for BP above range  Stat CT head for change in mental status  f/u lipid profile, PT  Monitor on tele and follow echocardiogram  Neuro Dr Reynaga on board  Possible source in L ICA stenosis. Will need vascular eval  Complate cardiac workup to r/o afib. No afib seen on tele  CLARITZA tomorrow, NPO after midnight  Vascular- Dr. Sanchez  Cardio - Dr. Freeman

## 2019-12-15 NOTE — PROGRESS NOTE ADULT - ATTENDING COMMENTS
78 yo M with pmh of htn, hld, ckd, dm who presented with aphasia. Pt found to have acute bilateral parietal cva. Pt to remain on telemetry to r/o arrythmia. Pt for benny in am to r/o embolic source. Left ICA noted to have >70% stenosis. Vascular consulted. Pt also with influenza on Tamiflu.   Pt and pt's son informed of plan and recommendations.

## 2019-12-15 NOTE — PROGRESS NOTE ADULT - ASSESSMENT
Pt is a 79m from home with pmh of HTN, DM and hyperlipidemia who came to ED with c/o aphasia. Pt family at bedside giving details of all history as pt is having some speech difficulty. As per son pt started having difficulty in speech rebecca word finding difficulty that started around 4am. Symptoms waxed and waned pt never returned to baseline and was brought to ED. Pt is independent at baseline with no h/o of prior strokes/speech problem. Pt denies any headchae, weakness, chest pain, palpitations change in bowel or urinary habits. Pt also recently travelled and is also having cough past few days but no fever/chills reported  Pt is non smoker and non-alcoholic, lives at home with family, ambulated without any assistance at baseline.

## 2019-12-15 NOTE — PROGRESS NOTE ADULT - PROBLEM SELECTOR PLAN 5
Pt also found to be positive for Flu\  Droplet isolation and c/u tamiflu  No consolidation on CT chest  Holding abx  f/u blood cultures and monitor for fever

## 2019-12-16 LAB
GLUCOSE BLDC GLUCOMTR-MCNC: 113 MG/DL — HIGH (ref 70–99)
GLUCOSE BLDC GLUCOMTR-MCNC: 133 MG/DL — HIGH (ref 70–99)
GLUCOSE BLDC GLUCOMTR-MCNC: 207 MG/DL — HIGH (ref 70–99)

## 2019-12-16 PROCEDURE — 99222 1ST HOSP IP/OBS MODERATE 55: CPT

## 2019-12-16 PROCEDURE — 99233 SBSQ HOSP IP/OBS HIGH 50: CPT | Mod: GC

## 2019-12-16 PROCEDURE — 93312 ECHO TRANSESOPHAGEAL: CPT | Mod: 26

## 2019-12-16 PROCEDURE — 93320 DOPPLER ECHO COMPLETE: CPT | Mod: 26

## 2019-12-16 PROCEDURE — 99222 1ST HOSP IP/OBS MODERATE 55: CPT | Mod: 25

## 2019-12-16 PROCEDURE — 93325 DOPPLER ECHO COLOR FLOW MAPG: CPT | Mod: 26

## 2019-12-16 RX ORDER — HYDROCHLOROTHIAZIDE 25 MG
12.5 TABLET ORAL DAILY
Refills: 0 | Status: DISCONTINUED | OUTPATIENT
Start: 2019-12-16 | End: 2019-12-17

## 2019-12-16 RX ORDER — LABETALOL HCL 100 MG
10 TABLET ORAL ONCE
Refills: 0 | Status: COMPLETED | OUTPATIENT
Start: 2019-12-16 | End: 2019-12-16

## 2019-12-16 RX ORDER — AMLODIPINE BESYLATE 2.5 MG/1
5 TABLET ORAL DAILY
Refills: 0 | Status: DISCONTINUED | OUTPATIENT
Start: 2019-12-16 | End: 2019-12-17

## 2019-12-16 RX ORDER — LOSARTAN POTASSIUM 100 MG/1
100 TABLET, FILM COATED ORAL DAILY
Refills: 0 | Status: DISCONTINUED | OUTPATIENT
Start: 2019-12-16 | End: 2019-12-17

## 2019-12-16 RX ORDER — AMLODIPINE BESYLATE 2.5 MG/1
10 TABLET ORAL DAILY
Refills: 0 | Status: DISCONTINUED | OUTPATIENT
Start: 2019-12-16 | End: 2019-12-16

## 2019-12-16 RX ADMIN — Medication 81 MILLIGRAM(S): at 14:21

## 2019-12-16 RX ADMIN — Medication 10 MILLIGRAM(S): at 04:30

## 2019-12-16 RX ADMIN — ATORVASTATIN CALCIUM 40 MILLIGRAM(S): 80 TABLET, FILM COATED ORAL at 21:38

## 2019-12-16 RX ADMIN — Medication 2: at 17:14

## 2019-12-16 RX ADMIN — Medication 75 MILLIGRAM(S): at 05:44

## 2019-12-16 RX ADMIN — AMLODIPINE BESYLATE 5 MILLIGRAM(S): 2.5 TABLET ORAL at 05:44

## 2019-12-16 RX ADMIN — AMLODIPINE BESYLATE 5 MILLIGRAM(S): 2.5 TABLET ORAL at 14:22

## 2019-12-16 RX ADMIN — CLOPIDOGREL BISULFATE 75 MILLIGRAM(S): 75 TABLET, FILM COATED ORAL at 14:22

## 2019-12-16 RX ADMIN — LOSARTAN POTASSIUM 100 MILLIGRAM(S): 100 TABLET, FILM COATED ORAL at 17:14

## 2019-12-16 RX ADMIN — Medication 12.5 MILLIGRAM(S): at 17:14

## 2019-12-16 NOTE — CONSULT NOTE ADULT - SUBJECTIVE AND OBJECTIVE BOX
Called by the pt's son (ER doc) today to see pt today.  Adm'ed w expressive aphasia/slurring on friday.  Gradually improving.   No other neuro def's or vis loss.  No other periph vasc c/o  No GI/LE jay    PMH:   DM, CAD, R hemisph stroke  PSH:   CABG/L saphnectomy  R CEA 10 yrs (had vis disturbances)  Meds:   Now on ASA, plavix and lipitor  Not compliant w ASA/statin at home  NKDA  Non smoker  No CP/SOB/N/V/D/F/C/palps.  No probs swallowing.  FH: No aneurysms, clots    AAO, NAD, Tara  Face, tongue mid  Speech a little labored  No JVD/icterus  R neck incision C/D/I  Abd: S/NT, No pulsatility  LEs: Warm, no edema, CVI/vvs (ch, asymp)  L saphx healed  +rad, fem, pop, PT pulses b/l    CTA: athero.  L ICA prox stenosis - >70% reported.  R ICA patent.  Intracranial lesions    MRH: small L hemisph strokes    A/P:   Stroke  L ICA stenosis    Rec:   Agree w current med mgmt w dual antiplt meds and statin.  BP mgmt    Will need to clarify degree of L ICA stenosis- Will ask neurorad to do 3D/Vitrea recons.  Check carotid duplex    If L ICA stenosis confirmed >50% then will need intervention (L CEA/TCAR).  Will need to dw stroke neuro re timing etc.  Prior to surgery will also need ENT eval of VCs given h/o contralat CEA.  DW'ed pt and son.

## 2019-12-16 NOTE — CHART NOTE - NSCHARTNOTEFT_GEN_A_CORE
I was paged by RN at 1.36 am that blood pressure is 174/81. Pt is out of the permissive hypertension window after stroke and started on amlodipine. Lisinopril is on hold as Creatinine is elevated.  Repeat BP at 3 am showed 197/85 with HR 62.  I assessed the patient bedside who did not endorse headache, nausea, visual changes, respiratory difficulties.   S/p 10mg labetalol  Current /69 54  Primary team to follow

## 2019-12-16 NOTE — PROGRESS NOTE ADULT - ATTENDING COMMENTS
No new deficit. Remains with slurred speech.  Cardiology consult appreciated.     Vital Signs Last 24 Hrs  T(C): 37 (16 Dec 2019 08:05), Max: 37 (16 Dec 2019 08:05)  T(F): 98.6 (16 Dec 2019 08:05), Max: 98.6 (16 Dec 2019 08:05)  HR: 59 (16 Dec 2019 08:05) (54 - 70)  BP: 173/73 (16 Dec 2019 08:05) (153/78 - 208/86)  BP(mean): --  RR: 18 (16 Dec 2019 08:05) (18 - 18)  SpO2: 98% (16 Dec 2019 08:05) (95% - 98%)    A/P  Acute ischemic infarct left frontal lobe was out of tPA window.   Influenza  HTN above goal  Type 2DM  HLD  CAD s/p CABG 2013    Plan  C/W ASA, Plavix and Atorvastatin   Speech and Swallow evaluation and Speech therapy  Will resume home dose Losartan (baseline Cr 1.4 verified with son ) and continue Amlodipine 5 QD  F/u CLARITZA results  F/u Vascular surgery for carotid stenosis  Dispo: Home with home PT    Discussed with Dr. Thomason and Dr. Benz.

## 2019-12-16 NOTE — PROGRESS NOTE ADULT - PROBLEM SELECTOR PLAN 1
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 Will need vascular eval  Complate cardiac workup to r/o afib. No afib seen on tele  CLARITZA today  Vascular- Dr. Sanchez  Cardio - Dr. Freeman

## 2019-12-16 NOTE — CONSULT NOTE ADULT - ASSESSMENT
80 yo M with DM, HTN, HLD, and CAD s/p CABG (2013) who presented with aphasia in setting of CVA noted on neuroimaging.    1. CVA: Care as per neurology with aspirin, statin, clopidogrel  -Concern for possible cardioembolic etiology per neurology, will proceed with CLARITZA for further assessment. If negative, patient will be referred for loop recorder placement as outpatient (can see Dr. Romero at [phone]).   -Vascular surgery evaluation for carotid stenosis    2. CAD s/p CABG:  -Aspirin, statin  -Echocardiogram to assess LVEF    3. HTN: On amlodipine 5mg   -clarify baseline creatinine; if stable would resume home dose of losartan 100mg PO daily    4. HLD: Atorvastatin    ***Note that this is a preliminary note and any recommendations should NOT be carried out until this note is finalized. *** 80 yo M with DM, HTN, HLD, and CAD s/p CABG (2013) who presented with aphasia in setting of CVA noted on neuroimaging.    1. CVA: Care as per neurology with aspirin, statin, clopidogrel  -Concern for possible cardioembolic etiology per neurology, will proceed with CLARITZA for further assessment. If negative, patient will be referred for loop recorder placement as outpatient (can see Dr. Romero at [phone]).   -Vascular surgery evaluation for carotid stenosis    2. CAD s/p CABG:  -Aspirin, statin  -Echocardiogram to assess LVEF    3. HTN: On amlodipine 5mg   -Clarify baseline creatinine; if stable would resume home dose of losartan 100mg PO daily    4. HLD: Atorvastatin

## 2019-12-16 NOTE — PROGRESS NOTE ADULT - PROBLEM SELECTOR PLAN 5
Pt also found to be positive for Flu  Droplet isolation and c/u tamiflu  No consolidation on CT chest  Holding abx  Blood Cx negative

## 2019-12-16 NOTE — PROGRESS NOTE ADULT - SUBJECTIVE AND OBJECTIVE BOX
PGY1 Note discussed with supervising resident and primary attending.    Patient is a 79y old  Male who presents with a chief complaint of Aphasia (15 Dec 2019 10:11)      INTERVAL HPI/OVERNIGHT EVENTS: Patient seen and examined at bedside. Pt has some slurring of speech. CLARITZA today.     MEDICATIONS  (STANDING):  amLODIPine   Tablet 5 milliGRAM(s) Oral daily  aspirin  chewable 81 milliGRAM(s) Oral daily  atorvastatin 40 milliGRAM(s) Oral at bedtime  clopidogrel Tablet 75 milliGRAM(s) Oral daily  dextrose 50% Injectable 25 Gram(s) IV Push once  dextrose 50% Injectable 25 Gram(s) IV Push once  insulin lispro (HumaLOG) corrective regimen sliding scale   SubCutaneous three times a day before meals  oseltamivir 75 milliGRAM(s) Oral two times a day    MEDICATIONS  (PRN):      Allergies    No Known Allergies    Intolerances        REVIEW OF SYSTEMS:  CONSTITUTIONAL: No fever, weight loss, or fatigue  RESPIRATORY: No cough, wheezing, chills or hemoptysis; No shortness of breath  CARDIOVASCULAR: No chest pain, palpitations, dizziness, or leg swelling  GASTROINTESTINAL: No abdominal or epigastric pain. No nausea, vomiting, or hematemesis; No diarrhea or constipation. No melena or hematochezia.  NEUROLOGICAL: No headaches, memory loss, loss of strength, numbness, or tremors  SKIN: No itching, burning, rashes, or lesions     Vital Signs Last 24 Hrs  T(C): 37 (16 Dec 2019 08:05), Max: 37 (16 Dec 2019 08:05)  T(F): 98.6 (16 Dec 2019 08:05), Max: 98.6 (16 Dec 2019 08:05)  HR: 59 (16 Dec 2019 08:05) (54 - 79)  BP: 173/73 (16 Dec 2019 08:05) (153/78 - 208/86)  BP(mean): --  RR: 18 (16 Dec 2019 08:05) (18 - 18)  SpO2: 98% (16 Dec 2019 08:05) (95% - 100%)    PHYSICAL EXAM:  GENERAL: NAD, well-groomed, well-developed  HEAD:  Atraumatic, Normocephalic  EYES: EOMI, PERRLA, conjunctiva and sclera clear  NECK: Supple, No JVD, Normal thyroid  CHEST/LUNG: Clear to percussion bilaterally; No rales, rhonchi, wheezing, or rubs  HEART: Regular rate and rhythm; No murmurs, rubs, or gallops  ABDOMEN: Soft, Nontender, Nondistended; Bowel sounds present  NERVOUS SYSTEM:  Alert & Oriented X3, Good concentration; Motor Strength 5/5 B/L   EXTREMITIES:  2+ Peripheral Pulses, No clubbing, cyanosis, or edema  SKIN;    LABS:                        15.5   7.10  )-----------( 254      ( 15 Dec 2019 10:49 )             48.0     12-15    137  |  103  |  28<H>  ----------------------------<  202<H>  3.9   |  32<H>  |  1.47<H>    Ca    8.6      15 Dec 2019 10:49          CAPILLARY BLOOD GLUCOSE      POCT Blood Glucose.: 133 mg/dL (16 Dec 2019 07:49)  POCT Blood Glucose.: 129 mg/dL (15 Dec 2019 21:56)  POCT Blood Glucose.: 126 mg/dL (15 Dec 2019 16:40)  POCT Blood Glucose.: 176 mg/dL (15 Dec 2019 11:30)      RADIOLOGY & ADDITIONAL TESTS:    Imaging Personally Reviewed:  [ x] YES  [ ] NO    Consultant(s) Notes Reviewed:  [x ] YES  [ ] NO PGY1 Note discussed with supervising resident and primary attending.    Patient is a 79y old  Male who presents with a chief complaint of Aphasia (15 Dec 2019 10:11)      INTERVAL HPI/OVERNIGHT EVENTS: Patient seen and examined at bedside. Pt still  with some slurring of speech. Awaits CLARITZA today.     MEDICATIONS  (STANDING):  amLODIPine   Tablet 5 milliGRAM(s) Oral daily  aspirin  chewable 81 milliGRAM(s) Oral daily  atorvastatin 40 milliGRAM(s) Oral at bedtime  clopidogrel Tablet 75 milliGRAM(s) Oral daily  dextrose 50% Injectable 25 Gram(s) IV Push once  dextrose 50% Injectable 25 Gram(s) IV Push once  insulin lispro (HumaLOG) corrective regimen sliding scale   SubCutaneous three times a day before meals  oseltamivir 75 milliGRAM(s) Oral two times a day    MEDICATIONS  (PRN):      Allergies    No Known Allergies    Intolerances        REVIEW OF SYSTEMS:  CONSTITUTIONAL: No fever, weight loss, or fatigue  RESPIRATORY: No cough, wheezing, chills or hemoptysis; No shortness of breath  CARDIOVASCULAR: No chest pain, palpitations, dizziness, or leg swelling  GASTROINTESTINAL: No abdominal or epigastric pain. No nausea, vomiting, or hematemesis; No diarrhea or constipation. No melena or hematochezia.  NEUROLOGICAL: No headaches, memory loss, loss of strength, numbness, or tremors  SKIN: No itching, burning, rashes, or lesions     Vital Signs Last 24 Hrs  T(C): 37 (16 Dec 2019 08:05), Max: 37 (16 Dec 2019 08:05)  T(F): 98.6 (16 Dec 2019 08:05), Max: 98.6 (16 Dec 2019 08:05)  HR: 59 (16 Dec 2019 08:05) (54 - 79)  BP: 173/73 (16 Dec 2019 08:05) (153/78 - 208/86)  BP(mean): --  RR: 18 (16 Dec 2019 08:05) (18 - 18)  SpO2: 98% (16 Dec 2019 08:05) (95% - 100%)    PHYSICAL EXAM:  GENERAL: NAD, well-groomed, well-developed  HEAD:  Atraumatic, Normocephalic  EYES: EOMI, PERRLA, conjunctiva and sclera clear  NECK: Supple, No JVD, Normal thyroid  CHEST/LUNG: Clear to percussion bilaterally; No rales, rhonchi, wheezing, or rubs  HEART: Regular rate and rhythm; No murmurs, rubs, or gallops  ABDOMEN: Soft, Nontender, Nondistended; Bowel sounds present  NERVOUS SYSTEM:  Alert & Oriented X3, Good concentration; Motor Strength 5/5 B/L , slurred speech   EXTREMITIES:  2+ Peripheral Pulses, No clubbing, cyanosis, or edema      LABS:                        15.5   7.10  )-----------( 254      ( 15 Dec 2019 10:49 )             48.0     12-15    137  |  103  |  28<H>  ----------------------------<  202<H>  3.9   |  32<H>  |  1.47<H>    Ca    8.6      15 Dec 2019 10:49          CAPILLARY BLOOD GLUCOSE      POCT Blood Glucose.: 133 mg/dL (16 Dec 2019 07:49)  POCT Blood Glucose.: 129 mg/dL (15 Dec 2019 21:56)  POCT Blood Glucose.: 126 mg/dL (15 Dec 2019 16:40)  POCT Blood Glucose.: 176 mg/dL (15 Dec 2019 11:30)      RADIOLOGY & ADDITIONAL TESTS:    Imaging Personally Reviewed:  [ x] YES  [ ] NO    Consultant(s) Notes Reviewed:  [x ] YES  [ ] NO

## 2019-12-16 NOTE — CONSULT NOTE ADULT - SUBJECTIVE AND OBJECTIVE BOX
CHIEF COMPLAINT: Difficulty speaking    HPI: 78 yo M with DM, HTN, HLD, and CAD s/p CABG (2013) who presented with aphasia in setting of CVA noted on neuroimaging. Patient    PAST MEDICAL & SURGICAL HISTORY:  As above, also History of CEA (carotid endarterectomy): 4 years ago on the R    Allergies    No Known Allergies    MEDICATIONS  (STANDING):  amLODIPine   Tablet 5 milliGRAM(s) Oral daily  aspirin  chewable 81 milliGRAM(s) Oral daily  atorvastatin 40 milliGRAM(s) Oral at bedtime  clopidogrel Tablet 75 milliGRAM(s) Oral daily  dextrose 50% Injectable 25 Gram(s) IV Push once  dextrose 50% Injectable 25 Gram(s) IV Push once  insulin lispro (HumaLOG) corrective regimen sliding scale   SubCutaneous three times a day before meals  oseltamivir 75 milliGRAM(s) Oral two times a day    MEDICATIONS  (PRN):      FAMILY HISTORY:    No family history of premature coronary artery disease or sudden cardiac death    SOCIAL HISTORY:  Smoking-  Alcohol-  Illicit Drug use-    REVIEW OF SYSTEMS:  Constitutional: [ ] fever, [ ]weight loss,  [ ]fatigue  Eyes: [ ] visual changes  Respiratory: [ ]shortness of breath;  [ ] cough, [ ]wheezing, [ ]chills, [ ]hemoptysis  Cardiovascular: [ ] chest pain, [ ]palpitations, [ ]dizziness,  [ ]leg swelling [ ]syncope  Gastrointestinal: [ ] abdominal pain, [ ]nausea, [ ]vomiting,  [ ]diarrhea   Genitourinary: [ ] dysuria, [ ] hematuria  Neurologic: [ ] headaches [ ] tremors  [ ] weakness [ ] lightheadedness  Skin: [ ] itching, [ ]burning, [ ] rashes  Endocrine: [ ] heat or cold intolerance  Musculoskeletal: [ ] joint pain or swelling; [ ] muscle, back, or extremity pain  Psychiatric: [ ] depression, [ ]anxiety, [ ]mood swings, or [ ]difficulty sleeping  Hematologic: [ ] easy bruising, [ ] bleeding gums       [ x] All others negative	  [ ] Unable to obtain    Vital Signs Last 24 Hrs  T(C): 37 (16 Dec 2019 08:05), Max: 37 (16 Dec 2019 08:05)  T(F): 98.6 (16 Dec 2019 08:05), Max: 98.6 (16 Dec 2019 08:05)  HR: 59 (16 Dec 2019 08:05) (54 - 79)  BP: 173/73 (16 Dec 2019 08:05) (153/78 - 208/86)  BP(mean): --  RR: 18 (16 Dec 2019 08:05) (18 - 18)  SpO2: 98% (16 Dec 2019 08:05) (95% - 100%)  I&O's Summary    15 Dec 2019 07:01  -  16 Dec 2019 07:00  --------------------------------------------------------  IN: 200 mL / OUT: 0 mL / NET: 200 mL        PHYSICAL EXAM:  General: No acute distress  HEENT: EOMI, PERRL  Neck: Supple, No JVD  Lungs: Clear to auscultation bilaterally; No rales or wheezing  Heart: Regular rate and rhythm; No murmurs, rubs, or gallops  Abdomen: Nontender, bowel sounds present  Extremities: No clubbing, cyanosis, or edema  Nervous system:  Alert & Oriented X3, no focal deficits  Psychiatric: Normal affect  Skin: No rashes or lesions      LABS:  12-15    137  |  103  |  28<H>  ----------------------------<  202<H>  3.9   |  32<H>  |  1.47<H>    Ca    8.6      15 Dec 2019 10:49      Creatinine Trend: 1.47<--, 1.44<--, 1.43<--                        15.5   7.10  )-----------( 254      ( 15 Dec 2019 10:49 )             48.0         Lipid Panel:   Cardiac Enzymes:       12-15 [id number] 6.9      RADIOLOGY: < from: CT Brain Stroke Protocol (12.13.19 @ 12:03) >  No acute intracranial hemorrhage.    Nonspecific subcentimeter hypodensity left frontal lobe. Possible right   MCA sign.     < end of copied text >    < from: MR Head No Cont (12.13.19 @ 15:08) >  IMPRESSION:    Tiny acute bland cortical infarcts in distal left MCA branch distribution    < end of copied text >    < from: CT Angio Neck w/ IV Cont (12.13.19 @ 12:58) >  Severe (greater than 70%) narrowing at the origin of left internal   carotid artery.    Patent M1 segment of the right middle cerebral artery which appeared   hyperdense on thenoncontrast exam.    < end of copied text >    < from: CT Chest No Cont (12.13.19 @ 17:09) >  Impression:    Moderate hiatal hernia.    No evidence for pulmonary consolidation.    Nonspecific mildly enlarged subcarinal lymph node.    < end of copied text >    ECG [my interpretation]:    TELEMETRY:    ECHO:     STRESS TEST:    CATHETERIZATION:< from: Cardiac Cath Lab (07.17.13 @ 18:37) >  VENTRICLES: EF estimated was 65 %.  CORONARY VESSELS: The coronary circulation is right dominant.  LM:   --  Distal left main: There was a discrete 70 % stenosis.  LAD:   --  Mid LAD: There was a tubular 90 % stenosis.  CX:   --  Mid circumflex: There was a 90 % stenosis.  RCA:   --  Proximal RCA: There was a discrete 95 % stenosis.  --  MidRCA: There was a tubular 90 % stenosis.    < end of copied text > CHIEF COMPLAINT: Difficulty speaking    HPI: 80 yo M with DM, HTN, HLD, and CAD s/p CABG (2013) who presented with aphasia in setting of CVA noted on neuroimaging. Patient reports that Friday morning he was coughing when he had difficulty speaking. He called his son who then called EMS. Patient reports that he did not feel any chest pain, dyspnea, palpitations, lightheadedness, or syncope during that time. Currently reports his speech has improved, though not quite at baseline. Denies other complaints.  States he has been mostly compliant with his medications, but occasionally forgets to take them.     PAST MEDICAL & SURGICAL HISTORY:  As above, also History of CEA (carotid endarterectomy): 4 years ago on the R    Allergies    No Known Allergies    MEDICATIONS  (STANDING):  amLODIPine   Tablet 5 milliGRAM(s) Oral daily  aspirin  chewable 81 milliGRAM(s) Oral daily  atorvastatin 40 milliGRAM(s) Oral at bedtime  clopidogrel Tablet 75 milliGRAM(s) Oral daily  dextrose 50% Injectable 25 Gram(s) IV Push once  dextrose 50% Injectable 25 Gram(s) IV Push once  insulin lispro (HumaLOG) corrective regimen sliding scale   SubCutaneous three times a day before meals  oseltamivir 75 milliGRAM(s) Oral two times a day    MEDICATIONS  (PRN):      FAMILY HISTORY:    No family history of premature coronary artery disease or sudden cardiac death    SOCIAL HISTORY:  Smoking-Denies  Alcohol-Denies  Illicit Drug use-Denies    REVIEW OF SYSTEMS:  Constitutional: [ ] fever, [ ]weight loss,  [ ]fatigue  Eyes: [ ] visual changes  Respiratory: [ ]shortness of breath;  [ ] cough, [ ]wheezing, [ ]chills, [ ]hemoptysis  Cardiovascular: [ ] chest pain, [ ]palpitations, [ ]dizziness,  [ ]leg swelling [ ]syncope  Gastrointestinal: [ ] abdominal pain, [ ]nausea, [ ]vomiting,  [ ]diarrhea   Genitourinary: [ ] dysuria, [ ] hematuria  Neurologic: [ ] headaches [ ] tremors  [ ] weakness [ ] lightheadedness  Skin: [ ] itching, [ ]burning, [ ] rashes  Endocrine: [ ] heat or cold intolerance  Musculoskeletal: [ ] joint pain or swelling; [ ] muscle, back, or extremity pain  Psychiatric: [ ] depression, [ ]anxiety, [ ]mood swings, or [ ]difficulty sleeping  Hematologic: [ ] easy bruising, [ ] bleeding gums       [ x] All others negative	  [ ] Unable to obtain    Vital Signs Last 24 Hrs  T(C): 37 (16 Dec 2019 08:05), Max: 37 (16 Dec 2019 08:05)  T(F): 98.6 (16 Dec 2019 08:05), Max: 98.6 (16 Dec 2019 08:05)  HR: 59 (16 Dec 2019 08:05) (54 - 79)  BP: 173/73 (16 Dec 2019 08:05) (153/78 - 208/86)  BP(mean): --  RR: 18 (16 Dec 2019 08:05) (18 - 18)  SpO2: 98% (16 Dec 2019 08:05) (95% - 100%)  I&O's Summary    15 Dec 2019 07:01  -  16 Dec 2019 07:00  --------------------------------------------------------  IN: 200 mL / OUT: 0 mL / NET: 200 mL    PHYSICAL EXAM:  General: No acute distress  HEENT: EOMI, PERRL  Neck: Supple, No JVD  Lungs: Clear to auscultation bilaterally; No rales or wheezing  Heart: Regular rate and rhythm; No murmurs, rubs, or gallops  Abdomen: Nontender, bowel sounds present  Extremities: No clubbing, cyanosis, or edema  Nervous system:  Alert & Oriented X3, no focal deficits  Psychiatric: Normal affect  Skin: No rashes or lesions    LABS:  12-15    137  |  103  |  28<H>  ----------------------------<  202<H>  3.9   |  32<H>  |  1.47<H>    Ca    8.6      15 Dec 2019 10:49      Creatinine Trend: 1.47<--, 1.44<--, 1.43<--                        15.5   7.10  )-----------( 254      ( 15 Dec 2019 10:49 )             48.0         Lipid Panel:   Cardiac Enzymes:       12-15 [id number] 6.9      RADIOLOGY: < from: CT Brain Stroke Protocol (12.13.19 @ 12:03) >  No acute intracranial hemorrhage.    Nonspecific subcentimeter hypodensity left frontal lobe. Possible right   MCA sign.     < end of copied text >    < from: MR Head No Cont (12.13.19 @ 15:08) >  IMPRESSION:    Tiny acute bland cortical infarcts in distal left MCA branch distribution    < end of copied text >    < from: CT Angio Neck w/ IV Cont (12.13.19 @ 12:58) >  Severe (greater than 70%) narrowing at the origin of left internal   carotid artery.    Patent M1 segment of the right middle cerebral artery which appeared   hyperdense on the noncontrast exam.    < end of copied text >    < from: CT Chest No Cont (12.13.19 @ 17:09) >  Impression:    Moderate hiatal hernia.    No evidence for pulmonary consolidation.    Nonspecific mildly enlarged subcarinal lymph node.    < end of copied text >    ECG [my interpretation]: < from: 12 Lead ECG (12.13.19 @ 12:06) >  Sinus rhythm with 1st degree A-V block  Voltage criteria for left ventricular hypertrophy  Abnormal ECG    Confirmed by GUI TREJO    < end of copied text >      TELEMETRY: Sinus rhythm, sinus bradycardia in 40s-50s, no events    ECHO: Pending    CATHETERIZATION:< from: Cardiac Cath Lab (07.17.13 @ 18:37) >  VENTRICLES: EF estimated was 65 %.  CORONARY VESSELS: The coronary circulation is right dominant.  LM:   --  Distal left main: There was a discrete 70 % stenosis.  LAD:   --  Mid LAD: There was a tubular 90 % stenosis.  CX:   --  Mid circumflex: There was a 90 % stenosis.  RCA:   --  Proximal RCA: There was a discrete 95 % stenosis.  --  MidRCA: There was a tubular 90 % stenosis.    < end of copied text >

## 2019-12-17 ENCOUNTER — TRANSCRIPTION ENCOUNTER (OUTPATIENT)
Age: 79
End: 2019-12-17

## 2019-12-17 VITALS
SYSTOLIC BLOOD PRESSURE: 143 MMHG | DIASTOLIC BLOOD PRESSURE: 64 MMHG | OXYGEN SATURATION: 96 % | HEART RATE: 63 BPM | TEMPERATURE: 98 F | RESPIRATION RATE: 18 BRPM

## 2019-12-17 LAB
ANION GAP SERPL CALC-SCNC: 6 MMOL/L — SIGNIFICANT CHANGE UP (ref 5–17)
BASOPHILS # BLD AUTO: 0.04 K/UL — SIGNIFICANT CHANGE UP (ref 0–0.2)
BASOPHILS NFR BLD AUTO: 0.5 % — SIGNIFICANT CHANGE UP (ref 0–2)
BUN SERPL-MCNC: 25 MG/DL — HIGH (ref 7–18)
CALCIUM SERPL-MCNC: 8.6 MG/DL — SIGNIFICANT CHANGE UP (ref 8.4–10.5)
CHLORIDE SERPL-SCNC: 103 MMOL/L — SIGNIFICANT CHANGE UP (ref 96–108)
CO2 SERPL-SCNC: 29 MMOL/L — SIGNIFICANT CHANGE UP (ref 22–31)
CREAT SERPL-MCNC: 1.46 MG/DL — HIGH (ref 0.5–1.3)
EOSINOPHIL # BLD AUTO: 0.3 K/UL — SIGNIFICANT CHANGE UP (ref 0–0.5)
EOSINOPHIL NFR BLD AUTO: 3.9 % — SIGNIFICANT CHANGE UP (ref 0–6)
GLUCOSE BLDC GLUCOMTR-MCNC: 126 MG/DL — HIGH (ref 70–99)
GLUCOSE BLDC GLUCOMTR-MCNC: 256 MG/DL — HIGH (ref 70–99)
GLUCOSE SERPL-MCNC: 149 MG/DL — HIGH (ref 70–99)
HCT VFR BLD CALC: 47.1 % — SIGNIFICANT CHANGE UP (ref 39–50)
HGB BLD-MCNC: 15.3 G/DL — SIGNIFICANT CHANGE UP (ref 13–17)
IMM GRANULOCYTES NFR BLD AUTO: 0.3 % — SIGNIFICANT CHANGE UP (ref 0–1.5)
LYMPHOCYTES # BLD AUTO: 1.83 K/UL — SIGNIFICANT CHANGE UP (ref 1–3.3)
LYMPHOCYTES # BLD AUTO: 23.8 % — SIGNIFICANT CHANGE UP (ref 13–44)
MCHC RBC-ENTMCNC: 29.3 PG — SIGNIFICANT CHANGE UP (ref 27–34)
MCHC RBC-ENTMCNC: 32.5 GM/DL — SIGNIFICANT CHANGE UP (ref 32–36)
MCV RBC AUTO: 90.2 FL — SIGNIFICANT CHANGE UP (ref 80–100)
MONOCYTES # BLD AUTO: 0.83 K/UL — SIGNIFICANT CHANGE UP (ref 0–0.9)
MONOCYTES NFR BLD AUTO: 10.8 % — SIGNIFICANT CHANGE UP (ref 2–14)
NEUTROPHILS # BLD AUTO: 4.67 K/UL — SIGNIFICANT CHANGE UP (ref 1.8–7.4)
NEUTROPHILS NFR BLD AUTO: 60.7 % — SIGNIFICANT CHANGE UP (ref 43–77)
NRBC # BLD: 0 /100 WBCS — SIGNIFICANT CHANGE UP (ref 0–0)
PLATELET # BLD AUTO: 306 K/UL — SIGNIFICANT CHANGE UP (ref 150–400)
POTASSIUM SERPL-MCNC: 3.9 MMOL/L — SIGNIFICANT CHANGE UP (ref 3.5–5.3)
POTASSIUM SERPL-SCNC: 3.9 MMOL/L — SIGNIFICANT CHANGE UP (ref 3.5–5.3)
RBC # BLD: 5.22 M/UL — SIGNIFICANT CHANGE UP (ref 4.2–5.8)
RBC # FLD: 12.4 % — SIGNIFICANT CHANGE UP (ref 10.3–14.5)
SODIUM SERPL-SCNC: 138 MMOL/L — SIGNIFICANT CHANGE UP (ref 135–145)
WBC # BLD: 7.69 K/UL — SIGNIFICANT CHANGE UP (ref 3.8–10.5)
WBC # FLD AUTO: 7.69 K/UL — SIGNIFICANT CHANGE UP (ref 3.8–10.5)

## 2019-12-17 PROCEDURE — 81001 URINALYSIS AUTO W/SCOPE: CPT

## 2019-12-17 PROCEDURE — 85730 THROMBOPLASTIN TIME PARTIAL: CPT

## 2019-12-17 PROCEDURE — 93005 ELECTROCARDIOGRAM TRACING: CPT

## 2019-12-17 PROCEDURE — 36415 COLL VENOUS BLD VENIPUNCTURE: CPT

## 2019-12-17 PROCEDURE — 80053 COMPREHEN METABOLIC PANEL: CPT

## 2019-12-17 PROCEDURE — 93320 DOPPLER ECHO COMPLETE: CPT

## 2019-12-17 PROCEDURE — 92610 EVALUATE SWALLOWING FUNCTION: CPT

## 2019-12-17 PROCEDURE — 70498 CT ANGIOGRAPHY NECK: CPT

## 2019-12-17 PROCEDURE — 99285 EMERGENCY DEPT VISIT HI MDM: CPT | Mod: 25

## 2019-12-17 PROCEDURE — 71045 X-RAY EXAM CHEST 1 VIEW: CPT

## 2019-12-17 PROCEDURE — 99239 HOSP IP/OBS DSCHRG MGMT >30: CPT | Mod: GC

## 2019-12-17 PROCEDURE — 82962 GLUCOSE BLOOD TEST: CPT

## 2019-12-17 PROCEDURE — 93880 EXTRACRANIAL BILAT STUDY: CPT

## 2019-12-17 PROCEDURE — 83036 HEMOGLOBIN GLYCOSYLATED A1C: CPT

## 2019-12-17 PROCEDURE — 80061 LIPID PANEL: CPT

## 2019-12-17 PROCEDURE — 84443 ASSAY THYROID STIM HORMONE: CPT

## 2019-12-17 PROCEDURE — 93880 EXTRACRANIAL BILAT STUDY: CPT | Mod: 26

## 2019-12-17 PROCEDURE — 85027 COMPLETE CBC AUTOMATED: CPT

## 2019-12-17 PROCEDURE — 93312 ECHO TRANSESOPHAGEAL: CPT

## 2019-12-17 PROCEDURE — 82550 ASSAY OF CK (CPK): CPT

## 2019-12-17 PROCEDURE — 96374 THER/PROPH/DIAG INJ IV PUSH: CPT

## 2019-12-17 PROCEDURE — 83735 ASSAY OF MAGNESIUM: CPT

## 2019-12-17 PROCEDURE — 92523 SPEECH SOUND LANG COMPREHEN: CPT

## 2019-12-17 PROCEDURE — 84484 ASSAY OF TROPONIN QUANT: CPT

## 2019-12-17 PROCEDURE — 97161 PT EVAL LOW COMPLEX 20 MIN: CPT

## 2019-12-17 PROCEDURE — 85610 PROTHROMBIN TIME: CPT

## 2019-12-17 PROCEDURE — 80048 BASIC METABOLIC PNL TOTAL CA: CPT

## 2019-12-17 PROCEDURE — 87631 RESP VIRUS 3-5 TARGETS: CPT

## 2019-12-17 PROCEDURE — 82553 CREATINE MB FRACTION: CPT

## 2019-12-17 PROCEDURE — 82306 VITAMIN D 25 HYDROXY: CPT

## 2019-12-17 PROCEDURE — 93325 DOPPLER ECHO COLOR FLOW MAPG: CPT

## 2019-12-17 PROCEDURE — 70496 CT ANGIOGRAPHY HEAD: CPT

## 2019-12-17 PROCEDURE — 70450 CT HEAD/BRAIN W/O DYE: CPT

## 2019-12-17 PROCEDURE — 71250 CT THORAX DX C-: CPT

## 2019-12-17 PROCEDURE — 83880 ASSAY OF NATRIURETIC PEPTIDE: CPT

## 2019-12-17 PROCEDURE — 87040 BLOOD CULTURE FOR BACTERIA: CPT

## 2019-12-17 PROCEDURE — 70551 MRI BRAIN STEM W/O DYE: CPT

## 2019-12-17 RX ORDER — ASPIRIN/CALCIUM CARB/MAGNESIUM 324 MG
1 TABLET ORAL
Qty: 30 | Refills: 0
Start: 2019-12-17 | End: 2020-01-15

## 2019-12-17 RX ORDER — CLOPIDOGREL BISULFATE 75 MG/1
1 TABLET, FILM COATED ORAL
Qty: 30 | Refills: 0
Start: 2019-12-17 | End: 2020-01-15

## 2019-12-17 RX ORDER — ASPIRIN/CALCIUM CARB/MAGNESIUM 324 MG
1 TABLET ORAL
Qty: 0 | Refills: 0 | DISCHARGE
Start: 2019-12-17

## 2019-12-17 RX ORDER — CLOPIDOGREL BISULFATE 75 MG/1
1 TABLET, FILM COATED ORAL
Qty: 0 | Refills: 0 | DISCHARGE
Start: 2019-12-17

## 2019-12-17 RX ADMIN — Medication 3: at 08:04

## 2019-12-17 RX ADMIN — Medication 81 MILLIGRAM(S): at 12:29

## 2019-12-17 RX ADMIN — LOSARTAN POTASSIUM 100 MILLIGRAM(S): 100 TABLET, FILM COATED ORAL at 06:31

## 2019-12-17 RX ADMIN — CLOPIDOGREL BISULFATE 75 MILLIGRAM(S): 75 TABLET, FILM COATED ORAL at 12:29

## 2019-12-17 RX ADMIN — AMLODIPINE BESYLATE 5 MILLIGRAM(S): 2.5 TABLET ORAL at 06:31

## 2019-12-17 RX ADMIN — Medication 30 MILLIGRAM(S): at 06:31

## 2019-12-17 RX ADMIN — Medication 12.5 MILLIGRAM(S): at 06:31

## 2019-12-17 NOTE — SPEECH LANGUAGE PATHOLOGY EVALUATION - SLP CRITERIA FOR SKILLED THERAPEUTIC INTERVENTIONS MET
skilled criteria for intervention met/Pt p/w mild deficits but would benefit from skilled intervention

## 2019-12-17 NOTE — DISCHARGE NOTE PROVIDER - HOSPITAL COURSE
Pt is a 79m from home with pmh of HTN, DM and hyperlipidemia who came to ED with c/o aphasia.    Pt was admitted for Acute left MCA cortical infarcts    CTA showed mild narrowing of left MCA; severe stenosis of left ICA.     CLARITZA  was done to rule out cardiac emboli and it was  normal.     Pt was started on asa, plavix and statin.    Vascular surgery did not recommend emergent surgical intervention.    Carotid doppler    Overall condition improved. Pt ready for discharge. Pt is a 79m from home with pmh of HTN, DM and hyperlipidemia who came to ED with c/o aphasia.    Pt was admitted for Acute left MCA cortical infarcts    CTA showed mild narrowing of left MCA; severe stenosis of left ICA.     CLARITZA  was done to rule out cardiac emboli and it was  normal.     Pt was started on asa, plavix and statin.    Vascular surgery did not recommend emergent surgical intervention.    Carotid doppler showed     Overall condition improved. Pt ready for discharge. Pt is a 79m from home with pmh of HTN, DM and hyperlipidemia who came to ED with c/o aphasia.    Pt was admitted for Acute left MCA cortical infarcts    CTA showed mild narrowing of left MCA; severe stenosis of left ICA.     CLARITZA  was done to rule out cardiac emboli and it was  normal.     Pt was started on asa, plavix and statin.    Vascular surgery did not recommend emergent surgical intervention.    Carotid doppler showed 70-99% stenosis within the proximal left     internal carotid artery.     Overall condition improved. Pt ready for discharge. Pt is a 79m from home with pmh of HTN, DM and hyperlipidemia who came to ED with c/o aphasia.    Pt was admitted for Acute left MCA cortical infarcts. Evaluated by and Neurology and Cardiology.     CTA showed mild narrowing of left MCA; severe stenosis of left ICA.     CLARITZA  was done to rule out cardiac emboli and it was  normal. cardiology recommended outpatient loop recorder ( Dr. Romero at [phone]).         Pt was started on asa, plavix and statin.    Carotid doppler showed 70-99% stenosis within the proximal left internal carotid artery. Vascular surgery was consulted recommended outpatient surgical intervention    Overall condition improved. Pt ready for discharge.

## 2019-12-17 NOTE — DISCHARGE NOTE PROVIDER - ATTENDING COMMENTS
A/P    Acute ischemic infarct left frontal lobe was out of tPA window.     Influenza    HTN     Type 2DM    HLD    CAD s/p CABG 2013        Plan    C/W ASA, Plavix and Atorvastatin     Advised follow up with Vascular surgery Dr. Sanchez     Advised follow up with outpatient loop recorder ( Dr. Romero at [phone]).

## 2019-12-17 NOTE — SPEECH LANGUAGE PATHOLOGY EVALUATION - SLP DIAGNOSIS
Pt p/w mild dysarthria c/b moderately delayed initiation of speech, mild deficits in articulation precision. Pt p/w with mild aphasia c/b word finding difficulties and overt signs of frustration.

## 2019-12-17 NOTE — DISCHARGE NOTE PROVIDER - CARE PROVIDERS DIRECT ADDRESSES
[email or web address]

## 2019-12-17 NOTE — DISCHARGE NOTE PROVIDER - NSDCCPCAREPLAN_GEN_ALL_CORE_FT
PRINCIPAL DISCHARGE DIAGNOSIS  Diagnosis: Ischemic stroke  Assessment and Plan of Treatment: You came in with slurring of speeh. You were found to have Acute left MCA cortical infarcts. CT angiogram showed mild narrowing of left MCA; severe stenosis of left ICA. Transesophageal Echo  was done to rule out cardiac emboli and it was  normal. You were started on aspirin,plavix and statin. Vascular surgery did not recommend emergent surgical intervention. Follow up with vascular surgeon as an outpatient.         SECONDARY DISCHARGE DIAGNOSES  Diagnosis: Influenza A  Assessment and Plan of Treatment: You had flu on admission. You were given Tamiflu for 5 days for flu.    Diagnosis: HTN (hypertension)  Assessment and Plan of Treatment: Continue with your home medications. Eat healthy diet rich in fruits and vegetables. Avoid salty and fatty diet. Excercise regularly. See your PCP regularly.    Diagnosis: Diabetes  Assessment and Plan of Treatment: Continue with metformin. Eat healthy diet and excercise regularly.    Diagnosis: Hyperlipidemia  Assessment and Plan of Treatment: Continue with atorvastatin. Get your lipid levels checked regularly. PRINCIPAL DISCHARGE DIAGNOSIS  Diagnosis: Ischemic stroke  Assessment and Plan of Treatment: You came in with slurring of speeh. You were found to have Acute left MCA cortical infarcts. CT angiogram showed mild narrowing of left MCA; severe stenosis of left ICA. Transesophageal Echo  was done to rule out cardiac emboli and it was normal. You were started on aspirin,plavix and statin. Vascular surgery did not recommend emergent surgical intervention. Follow up with vascular surgeon as an outpatient for possible surgical intervention. Take aspirin, plavix and statin as prescribed.          SECONDARY DISCHARGE DIAGNOSES  Diagnosis: Influenza A  Assessment and Plan of Treatment: You had flu on admission. You were given Tamiflu for 5 days and completed the treatment for flu.    Diagnosis: HTN (hypertension)  Assessment and Plan of Treatment: Continue with your home medications. Eat healthy diet rich in fruits and vegetables. Avoid salty and fatty diet. Excercise regularly. See your PCP regularly.    Diagnosis: Diabetes  Assessment and Plan of Treatment: Continue with metformin. Eat healthy diet and excercise regularly.    Diagnosis: Hyperlipidemia  Assessment and Plan of Treatment: Continue with atorvastatin. Get your lipid levels checked regularly. PRINCIPAL DISCHARGE DIAGNOSIS  Diagnosis: Ischemic stroke  Assessment and Plan of Treatment: You came in with slurring of speeh. You were found to have Acute left MCA cortical infarcts. CT angiogram showed mild narrowing of left MCA; severe stenosis of left ICA. Carotid doppler showed 70-99% stenosis within the proximal left internal carotid artery. Transesophageal Echo  was done to rule out cardiac emboli and it was normal. You were started on aspirin,plavix and statin. Vascular surgery did not recommend emergent surgical intervention. Follow up with vascular surgeon Dr. Sanchez as an outpatient for possible surgical intervention. Take aspirin, plavix and statin as prescribed.          SECONDARY DISCHARGE DIAGNOSES  Diagnosis: Influenza A  Assessment and Plan of Treatment: You had flu on admission. You were given Tamiflu for 5 days and completed the treatment for flu.    Diagnosis: HTN (hypertension)  Assessment and Plan of Treatment: Continue with your home medications. Eat healthy diet rich in fruits and vegetables. Avoid salty and fatty diet. Excercise regularly. See your PCP regularly.    Diagnosis: Diabetes  Assessment and Plan of Treatment: Continue with metformin. Eat healthy diet and excercise regularly.    Diagnosis: Hyperlipidemia  Assessment and Plan of Treatment: Continue with atorvastatin. Get your lipid levels checked regularly.

## 2019-12-17 NOTE — DISCHARGE NOTE PROVIDER - NSDCMRMEDTOKEN_GEN_ALL_CORE_FT
amLODIPine 5 mg oral tablet: 1 tab(s) orally once a day  aspirin 81 mg oral tablet, chewable: 1 tab(s) orally once a day  atorvastatin 20 mg oral tablet: 1 tab(s) orally once a day  clopidogrel 75 mg oral tablet: 1 tab(s) orally once a day  losartan-hydrochlorothiazide 100mg-12.5mg oral tablet: 1 tab(s) orally once a day  metFORMIN 500 mg oral tablet: 1 tab(s) orally 2 times a day

## 2019-12-17 NOTE — SWALLOW BEDSIDE ASSESSMENT ADULT - SLP PERTINENT HISTORY OF CURRENT PROBLEM
78 y/o M from home with PMHx of HTN, DM and hyperlipidemia who came to ED with c/o aphasia. Pt family at bedside giving details of all history as pt is having some speech difficulty. As per son pt started having difficulty in speech rebecca word finding difficulty that started around 4am on day of admission. Symptoms waxed and waned pt never returned to baseline and was brought to ED. Pt is independent at baseline with no h/o of prior strokes/speech problem. Pt denies any headchae, weakness, chest pain, palpitations change in bowel or urinary habits. Pt also recently travelled and is also having cough past few days but no fever/chills reported.MRBrain (+) Tiny acute bland cortical infarcts in distal left MCA branch distribution.

## 2019-12-17 NOTE — SPEECH LANGUAGE PATHOLOGY EVALUATION - COMMENTS
Pt seen for speech/language evaluation. Pt AAOx4. Pt's first language is Setswana; denied translation services. Pt's nephew present towards end of exam. pt p/w difficulties understanding convergent naming task (2/2 to language barrier) Divergent naming: WFL (named 8 animals in 1 minute)  Convergent naming: pt p/w difficulties understanding task (2/2 to language barrier)  During moments of anomia, pt demonstrated mild delay in speech initiation and visible frustration (agitated hand gesture on head). Pt demonstrated minimal eye contact with clinician during examination.

## 2019-12-17 NOTE — SWALLOW BEDSIDE ASSESSMENT ADULT - SWALLOW EVAL: RECOMMENDED FEEDING/EATING TECHNIQUES
position upright (90 degrees)/allow for swallow between intakes/check mouth frequently for oral residue/pocketing/crush medication (when feasible)/hard swallow w/ each bite or sip/alternate food with liquid/maintain upright posture during/after eating for 30 mins/oral hygiene

## 2019-12-17 NOTE — SWALLOW BEDSIDE ASSESSMENT ADULT - COMMENTS
Pt received seated at edge of bed. Pt AAOx4. Pt speaks Yoruba, but denied translation services. Pt denied any issues swallowing. Pt received trials of puree, solid, and thin.

## 2019-12-17 NOTE — SPEECH LANGUAGE PATHOLOGY EVALUATION - DESCRIBE:
yes/no answered 4/4 accurately (e.g., "Is your name Calvin?") accurately answered basic problem solving ("do you eat a banana before you eat it") identified birthdate, birthplace

## 2019-12-19 LAB
CULTURE RESULTS: SIGNIFICANT CHANGE UP
CULTURE RESULTS: SIGNIFICANT CHANGE UP
SPECIMEN SOURCE: SIGNIFICANT CHANGE UP
SPECIMEN SOURCE: SIGNIFICANT CHANGE UP

## 2020-01-30 PROBLEM — E11.9 TYPE 2 DIABETES MELLITUS WITHOUT COMPLICATIONS: Chronic | Status: ACTIVE | Noted: 2019-12-13

## 2020-02-06 ENCOUNTER — APPOINTMENT (OUTPATIENT)
Dept: CARDIOLOGY | Facility: CLINIC | Age: 80
End: 2020-02-06
Payer: MEDICARE

## 2020-02-06 VITALS
BODY MASS INDEX: 24.75 KG/M2 | HEIGHT: 64 IN | WEIGHT: 145 LBS | OXYGEN SATURATION: 97 % | DIASTOLIC BLOOD PRESSURE: 81 MMHG | HEART RATE: 58 BPM | SYSTOLIC BLOOD PRESSURE: 180 MMHG

## 2020-02-06 DIAGNOSIS — I63.9 CEREBRAL INFARCTION, UNSPECIFIED: ICD-10-CM

## 2020-02-06 DIAGNOSIS — Z95.1 PRESENCE OF AORTOCORONARY BYPASS GRAFT: ICD-10-CM

## 2020-02-06 DIAGNOSIS — Z01.810 ENCOUNTER FOR PREPROCEDURAL CARDIOVASCULAR EXAMINATION: ICD-10-CM

## 2020-02-06 DIAGNOSIS — I25.10 ATHEROSCLEROTIC HEART DISEASE OF NATIVE CORONARY ARTERY W/OUT ANGINA PECTORIS: ICD-10-CM

## 2020-02-06 DIAGNOSIS — I65.29 OCCLUSION AND STENOSIS OF UNSPECIFIED CAROTID ARTERY: ICD-10-CM

## 2020-02-06 DIAGNOSIS — I10 ESSENTIAL (PRIMARY) HYPERTENSION: ICD-10-CM

## 2020-02-06 DIAGNOSIS — E78.00 PURE HYPERCHOLESTEROLEMIA, UNSPECIFIED: ICD-10-CM

## 2020-02-06 PROCEDURE — 99205 OFFICE O/P NEW HI 60 MIN: CPT

## 2020-02-06 PROCEDURE — 93000 ELECTROCARDIOGRAM COMPLETE: CPT

## 2020-02-08 PROBLEM — I63.9 STROKE: Status: ACTIVE | Noted: 2020-02-08

## 2020-02-08 PROBLEM — Z01.810 PRE-OPERATIVE CARDIOVASCULAR EXAMINATION: Status: ACTIVE | Noted: 2020-02-08

## 2020-02-08 NOTE — PHYSICAL EXAM
[General Appearance - Well Developed] : well developed [Normal Appearance] : normal appearance [General Appearance - Well Nourished] : well nourished [Well Groomed] : well groomed [General Appearance - In No Acute Distress] : no acute distress [No Deformities] : no deformities [Normal Conjunctiva] : the conjunctiva exhibited no abnormalities [Normal Oral Mucosa] : normal oral mucosa [Eyelids - No Xanthelasma] : the eyelids demonstrated no xanthelasmas [No Oral Cyanosis] : no oral cyanosis [No Oral Pallor] : no oral pallor [Heart Rate And Rhythm] : heart rate and rhythm were normal [Heart Sounds] : normal S1 and S2 [Respiration, Rhythm And Depth] : normal respiratory rhythm and effort [Auscultation Breath Sounds / Voice Sounds] : lungs were clear to auscultation bilaterally [Exaggerated Use Of Accessory Muscles For Inspiration] : no accessory muscle use [Abdomen Tenderness] : non-tender [Abdomen Soft] : soft [Abdomen Mass (___ Cm)] : no abdominal mass palpated [Abnormal Walk] : normal gait [Gait - Sufficient For Exercise Testing] : the gait was sufficient for exercise testing [Cyanosis, Localized] : no localized cyanosis [Nail Clubbing] : no clubbing of the fingernails [Petechial Hemorrhages (___cm)] : no petechial hemorrhages [Skin Color & Pigmentation] : normal skin color and pigmentation [] : no rash [No Venous Stasis] : no venous stasis [Skin Lesions] : no skin lesions [No Skin Ulcers] : no skin ulcer [No Xanthoma] : no  xanthoma was observed [Oriented To Time, Place, And Person] : oriented to person, place, and time [Affect] : the affect was normal [No Anxiety] : not feeling anxious [Mood] : the mood was normal [FreeTextEntry1] : 2-3/6 SM

## 2020-02-08 NOTE — REASON FOR VISIT
[FreeTextEntry1] : Dr. Calvin Mckeon [facility]\par Vascular Sx\par \par Cheryl [email or web address]\par \par Dear Dr. Mckeon:\par \par I had the pleasure of evaluating your patient, Mr. Gavin Benz, in the office today for initial cardiovascular and pre-procedural cardiovascular evaluation.  I last saw him in 2013 immediately after his CABG x 4.  He also has a history of DM2, HTN, hyperlipidemia, and prior stroke s/p right CEA in 2009.  He had presented to an outside local hospital with symptoms concerning for acute stroke and was noted to have an ipsilateral 50-79% left internal carotid artery stenosis, with plans for left CEA to be performed on 2/28/20.\par \par He currently has no exertional complaints.  Denies chest pain, dyspnea, palpitations, orthopnea/PND, edema, syncope.  Echocardiogram (CLARITZA) performed as part of his thromboembolic workup at the OSH noted normal biventricular systolic function LVEF 55-60%.  \par \par 12-lead ECG notes NSR, normal axis, normal intervals.  Physical examination was notable for left subclavian and carotid bruits, as well as a Grade 2-3/6 systolic cardiac murmur.\par \par From the cardiac perspective, Mr. Benz may proceed with vascular surgery (CEA with any form of desired anesthesia) without the need for further cardiac testing or risk stratification.\par \par Thank you for entrusting his care with me.\par \par Warmest regards,\par Luigi Chavez MD, PhD, FACC\par

## 2020-02-10 ENCOUNTER — APPOINTMENT (OUTPATIENT)
Dept: PULMONOLOGY | Facility: CLINIC | Age: 80
End: 2020-02-10
Payer: MEDICARE

## 2020-02-10 VITALS
HEART RATE: 70 BPM | SYSTOLIC BLOOD PRESSURE: 179 MMHG | BODY MASS INDEX: 24.71 KG/M2 | DIASTOLIC BLOOD PRESSURE: 71 MMHG | HEIGHT: 63.6 IN | OXYGEN SATURATION: 96 % | WEIGHT: 143 LBS

## 2020-02-10 DIAGNOSIS — Z86.79 PERSONAL HISTORY OF OTHER DISEASES OF THE CIRCULATORY SYSTEM: ICD-10-CM

## 2020-02-10 DIAGNOSIS — Z01.811 ENCOUNTER FOR PREPROCEDURAL RESPIRATORY EXAMINATION: ICD-10-CM

## 2020-02-10 DIAGNOSIS — Z86.39 PERSONAL HISTORY OF OTHER ENDOCRINE, NUTRITIONAL AND METABOLIC DISEASE: ICD-10-CM

## 2020-02-10 PROCEDURE — 94727 GAS DIL/WSHOT DETER LNG VOL: CPT

## 2020-02-10 PROCEDURE — 99203 OFFICE O/P NEW LOW 30 MIN: CPT | Mod: 25

## 2020-02-10 PROCEDURE — 94729 DIFFUSING CAPACITY: CPT

## 2020-02-10 PROCEDURE — 94060 EVALUATION OF WHEEZING: CPT

## 2020-02-10 RX ORDER — ATORVASTATIN CALCIUM 40 MG/1
40 TABLET, FILM COATED ORAL
Refills: 0 | Status: ACTIVE | COMMUNITY

## 2020-02-10 RX ORDER — AMLODIPINE BESYLATE 10 MG/1
10 TABLET ORAL
Refills: 0 | Status: ACTIVE | COMMUNITY

## 2020-02-10 RX ORDER — CLOPIDOGREL 75 MG/1
75 TABLET, FILM COATED ORAL
Refills: 0 | Status: ACTIVE | COMMUNITY

## 2020-02-10 RX ORDER — LOSARTAN POTASSIUM AND HYDROCHLOROTHIAZIDE 12.5; 1 MG/1; MG/1
100-12.5 TABLET ORAL
Refills: 0 | Status: ACTIVE | COMMUNITY

## 2020-02-10 NOTE — DISCUSSION/SUMMARY
[FreeTextEntry1] : 79 yo male with stable preop pulmonary evaluation prior to carotid endarterectomy.I reviewed the PFT results with the patient and his son, who is an MD and is present. At present there is no pulmonary contraindication for planned surgery and anesthesia. He is to follow up with his PMD as before,

## 2020-02-10 NOTE — HISTORY OF PRESENT ILLNESS
[TextBox_4] : 79 yo male presents for preop pulmonary evaluation prior to carotid endarterectomy. The patient denies significant pulmonary complaints. He had a recent CVA without motor deficit.

## 2020-02-10 NOTE — PHYSICAL EXAM
[No Acute Distress] : no acute distress [Normal Oropharynx] : normal oropharynx [Normal Appearance] : normal appearance [No Neck Mass] : no neck mass [Normal Rate/Rhythm] : normal rate/rhythm [Normal S1, S2] : normal s1, s2 [No Resp Distress] : no resp distress [No Murmurs] : no murmurs [Clear to Auscultation Bilaterally] : clear to auscultation bilaterally [No Abnormalities] : no abnormalities [Benign] : benign [Normal Gait] : normal gait [No Cyanosis] : no cyanosis [No Edema] : no edema [No Clubbing] : no clubbing [FROM] : FROM [Normal Color/ Pigmentation] : normal color/ pigmentation [No Focal Deficits] : no focal deficits [Oriented x3] : oriented x3 [Normal Affect] : normal affect

## 2020-02-11 ENCOUNTER — APPOINTMENT (OUTPATIENT)
Dept: PULMONOLOGY | Facility: CLINIC | Age: 80
End: 2020-02-11

## 2020-03-11 ENCOUNTER — APPOINTMENT (OUTPATIENT)
Dept: VASCULAR SURGERY | Facility: CLINIC | Age: 80
End: 2020-03-11
Payer: MEDICARE

## 2020-03-11 DIAGNOSIS — Z86.73 PERSONAL HISTORY OF TRANSIENT ISCHEMIC ATTACK (TIA), AND CEREBRAL INFARCTION W/OUT RESIDUAL DEFICITS: ICD-10-CM

## 2020-03-11 PROCEDURE — 93880 EXTRACRANIAL BILAT STUDY: CPT

## 2020-03-11 PROCEDURE — 99204 OFFICE O/P NEW MOD 45 MIN: CPT

## 2020-03-18 NOTE — HISTORY OF PRESENT ILLNESS
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 Flowers, s/p R CEA 2016 (TIA symptoms with visual disturbance) with Dr. Mckeon presents today for initial evaluation of carotids s/p L CE[place] He is referred here by Dr. Mckeon. Patient reports doing well today. Denies any dizziness, speech impairment, vision changes or other neurological symptoms. No fever or chills. He is very active. 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 Luigi Chavez.

## 2020-03-18 NOTE — PHYSICAL EXAM
[Respiratory Effort] : normal respiratory effort [Alert] : alert [Oriented to Person] : oriented to person [Oriented to Place] : oriented to place [Oriented to Time] : oriented to time [Calm] : calm [Normal Breath Sounds] : Normal breath sounds [Normal Heart Sounds] : normal heart sounds [2+] : left 2+ [JVD] : no jugular venous distention  [de-identified] : Well-appearing, NAD [de-identified] : NCAT [de-identified] : L CEA incision healing well with a 5 cm fluid collection under wound.   No drainage.    Non tender. No redness.  [de-identified] : FROM

## 2020-03-18 NOTE — ASSESSMENT
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 Flowers, s/p R CEA 2016 (TIA symptoms with visual disturbance) with Dr. Mckeon presents today for initial evaluation of carotids s/p L CEA 02/28/20 in [place]. Patient is doing well overall. On exam, the L CEA incision is healing well with liquified hematoma and mild edema. Carotid Duplex today demonstrates patent CEA bilaterally. He is stable and asymptomatic to any TIA/CVA symptoms.\par \par  I advised patient to continue with Plavix and ASA. Patient will follow-up here in 2-3 weeks or sooner if necessary. Will communicate with Dr. Mckeon and Dr. Chavez about today's visit.  Also a discussion whether Plavix is needed. Will defer to Dr Chavez.   \par \par Patient is accompanied by his son today.

## 2020-03-18 NOTE — ADDENDUM
[FreeTextEntry1] : This note was written by Elizabeth Cast on 03/11/2020 acting as scribe for Dr. Hagen.\par

## 2020-03-18 NOTE — END OF VISIT
[FreeTextEntry3] : All medical record entries made by the Scribe were at my, Dr. Hagen's, discretion and personally dictated by me on 03/11/2020 . I have reviewed the chart and agree that the record accurately reflects my personal performance of the history, physical exam, assessment and plan. I have also personally directed, reviewed and agreed to the chart.

## 2020-04-01 ENCOUNTER — APPOINTMENT (OUTPATIENT)
Dept: VASCULAR SURGERY | Facility: CLINIC | Age: 80
End: 2020-04-01

## 2020-11-22 ENCOUNTER — EMERGENCY (EMERGENCY)
Facility: HOSPITAL | Age: 80
LOS: 1 days | Discharge: HOME | End: 2020-11-22
Attending: EMERGENCY MEDICINE
Payer: MEDICARE

## 2020-11-22 VITALS
WEIGHT: 147.71 LBS | HEIGHT: 65 IN | DIASTOLIC BLOOD PRESSURE: 85 MMHG | HEART RATE: 80 BPM | OXYGEN SATURATION: 99 % | RESPIRATION RATE: 18 BRPM | TEMPERATURE: 98 F | SYSTOLIC BLOOD PRESSURE: 222 MMHG

## 2020-11-22 VITALS — DIASTOLIC BLOOD PRESSURE: 72 MMHG | HEART RATE: 78 BPM | SYSTOLIC BLOOD PRESSURE: 177 MMHG

## 2020-11-22 LAB
RAPID RVP RESULT: DETECTED
RV+EV RNA SPEC QL NAA+PROBE: DETECTED
SARS-COV-2 RNA SPEC QL NAA+PROBE: SIGNIFICANT CHANGE UP

## 2020-11-22 PROCEDURE — 99283 EMERGENCY DEPT VISIT LOW MDM: CPT

## 2020-11-22 PROCEDURE — 71046 X-RAY EXAM CHEST 2 VIEWS: CPT | Mod: 26

## 2020-11-22 PROCEDURE — 71046 X-RAY EXAM CHEST 2 VIEWS: CPT

## 2020-11-22 PROCEDURE — 99283 EMERGENCY DEPT VISIT LOW MDM: CPT | Mod: 25

## 2020-11-22 PROCEDURE — 0225U NFCT DS DNA&RNA 21 SARSCOV2: CPT

## 2020-11-22 NOTE — ED PROVIDER NOTE - ATTENDING CONTRIBUTION TO CARE
79 yo male with PMHx Carotid Artery Disease, Diabetes and HTN (hypertension) presenting to ED with complaints of several days of cough, waxing and waning fever and chills. Patient is not up to date on flu vaccine. No sick contacts, no recent travel. patient brought in by family who is a medical doctor, requesting CXR and COVID swab.

## 2020-11-22 NOTE — ED ADULT NURSE NOTE - ABDOMEN
Physical Therapy Daily Progress Note    Patient Information  Rajan Chambers  1955      Visit # : 2    Rajan Chambers reports 3-4/10 pain today at rest.  Pt reports some of the stretches seem to make the sxs elevate.         Objective Pt presents to PT today with no distress noted at rest.     Pt with pain more localized in the L L/S area about the PSIS area - Ice seemed to reduce pain significantly post exercises.     Pt with improved status post PT.       See Exercise, Manual, and Modality Logs for complete treatment.     Assessment/Plan  Pt with improved pain and less distress.  He is moving better as well.       Progress per Plan of Care and Progress strengthening /stabilization /functional activity  Check response to the new exercises and Ice.     Visit Diagnoses:    ICD-10-CM ICD-9-CM   1. Acute left-sided low back pain without sciatica M54.5 724.2   2. Pain from bone metastases (CMS/HCC) G89.3 338.3    C79.51             Manual Therapy:    4     mins  55928;  Therapeutic Exercise:    41     mins  34738;     Neuromuscular Karissa:        mins  50404;    Therapeutic Activity:          mins  60434;     Gait Training:        ___  mins  60118;     Ultrasound:          mins  22856;    Electrical Stimulation:         mins  93664 ( );  Dry Needling          mins self-pay    Timed Treatment:   45   mins   Total Treatment:     55   mins    Skinny Thomas, PT  Physical Therapist         soft

## 2020-11-22 NOTE — ED PROVIDER NOTE - PROGRESS NOTE DETAILS
Patient to accept text results. Pt is well appearing walking with steady gait, stable for discharge and follow up without fail with medical doctor. I had a detailed discussion with the patient and/or guardian regarding the historical points, exam findings, and any diagnostic results supporting the discharge diagnosis. Pt educated on care and need for follow up. Strict return instructions and red flag signs and symptoms discussed with patient. Questions answered. Pt shows understanding of discharge information and agrees to follow.

## 2020-11-22 NOTE — ED PROVIDER NOTE - PHYSICAL EXAMINATION
Lungs CTA with equal bilateral chest rise, speaking in full sentences, no increased work of breathing.

## 2020-11-22 NOTE — ED PROVIDER NOTE - PATIENT PORTAL LINK FT
You can access the FollowMyHealth Patient Portal offered by [facility] by registering at the following website: [email or web address]. By joining [employer]’s FollowMyHealth portal, you will also be able to view your health information using other applications (apps) compatible with our system.

## 2020-11-22 NOTE — ED PROVIDER NOTE - OBJECTIVE STATEMENT
81 yo male with PMHx Carotid Artery Disease, Diabetes and HTN (hypertension) presenting to ED with complaints of several days of cough, waxing and waning fever and chills. Patient is not up to date on flu vaccine. No sick contacts, no recent travel. patient brought in by family who is a medical doctor, requesting CXR and COVID swab.

## 2020-12-01 NOTE — DISCHARGE NOTE NURSING/CASE MANAGEMENT/SOCIAL WORK - PATIENT PORTAL LINK FT
You can access the FollowMyHealth Patient Portal offered by [facility] by registering at the following website: [email or web address]. By joining [employer]’s FollowMyHealth portal, you will also be able to view your health information using other applications (apps) compatible with our system.
vital signs taken by OR staff today

## 2022-06-09 NOTE — PATIENT PROFILE ADULT - NSPROPTRIGHTCAREGIVER_GEN_A_NUR
Detail Level: Simple
Add 58059 Cpt? (Important Note: In 2017 The Use Of 29775 Is Being Tracked By Cms To Determine Future Global Period Reimbursement For Global Periods): yes
Additional Comments: Pt on DCN from PCP x 1 week
no

## 2024-05-16 NOTE — PROGRESS NOTE ADULT - PROBLEM SELECTOR PROBLEM 1
Patient's BPs improve when patient in trendelenberg position, but patient cannot stay that way long as she states it makes it harder for her to breathe   CVA (cerebral vascular accident) NPO from 10pm until present water at 8 am with tylenol 1000mg NPO from 10pm until present water at 8 am with Tylenol 1000mg

## 2024-06-24 NOTE — DISCHARGE NOTE PROVIDER - [facility]_MEDRECSTATUS_GEN_ALL_CORE
Referring Provider (Optional): Marcy Mitchell MD Anesthesia Volume In Cc: 2 Did You Provide Opioid Counseling: No Repair Type: Flap Which Eyelid Repair Cpt Are You Using?: 18230 Suturegard Retention Suture: 2-0 Nylon Retention Suture Bite Size: 3 mm Length To Time In Minutes Device Was In Place: 10 Number Of Hemigard Strips Per Side: 1 Simple / Intermediate / Complex Repair - Final Wound Length In Cm: 0 Undermining Type: Entire Wound Debridement Text: The wound edges were debrided prior to proceeding with the closure to facilitate wound healing. Helical Rim Text: The closure involved the helical rim. Vermilion Border Text: The closure involved the vermilion border. Nostril Rim Text: The closure involved the nostril rim. Retention Suture Text: Retention sutures were placed to support the closure and prevent dehiscence. Flap Type: Adjacent Tissue Transfer Primary Defect Length (In Cm): 1.1 Secondary Defect Length (In Cm): 4.4 Skin Substitute: EpiFix Micronized Suture Removal: 7 days Type Of Previous Surgery (Optional- Ie Mohs Surgery): MOHS Date Of Previous Surgery (Optional): 6/24/24 Date Of Previous Biopsy (Optional): 4/3/24 Previous Accession (Optional): [id number] Detail Level: Detailed Admission Reconciliation is Completed  Discharge Reconciliation is Not Complete Anesthesia Type: 1% lidocaine with epinephrine and a 1:10 solution of 8.4% sodium bicarbonate Additional Anesthesia Volume In Cc: 6 Hemostasis: Electrocautery Estimated Blood Loss (Cc): minimal Admission Reconciliation is Completed  Discharge Reconciliation is Completed Brow Lift Text: A midfrontal incision was made medially to the defect to allow access to the tissues just superior to the left eyebrow. Following careful dissection inferiorly in a supraperiosteal plane to the level of the left eyebrow, several 3-0 monocryl sutures were used to resuspend the eyebrow orbicularis oculi muscular unit to the superior frontal bone periosteum. This resulted in an appropriate reapproximation of static eyebrow symmetry and correction of the left brow ptosis. Deep Sutures: 4-0 Monocryl Epidermal Sutures: 5-0 Nylon Epidermal Closure: simple interrupted Wound Care: Petrolatum Dressing: dry sterile dressing Unna Boot Text: An Unna boot was placed to help immobilize the limb and facilitate more rapid healing. Suturegard Intro: Intraoperative tissue expansion was performed, utilizing the SUTUREGARD device, in order to reduce wound tension. Suturegard Body: The suture ends were repeatedly re-tightened and re-clamped to achieve the desired tissue expansion. Hemigard Intro: Due to skin fragility and wound tension, it was decided to use HEMIGARD adhesive retention suture devices to permit a linear closure. The skin was cleaned and dried for a 6cm distance away from the wound. Excessive hair, if present, was removed to allow for adhesion. Hemigard Postcare Instructions: The HEMIGARD strips are to remain completely dry for at least 5-7 days. Graft Donor Site Bandage (Optional-Leave Blank If You Don't Want In Note): Steri-strips and a pressure bandage were applied to the donor site. Closure 2 Information: This tab is for additional flaps and grafts, including complex repair and grafts and complex repair and flaps. You can also specify a different location for the additional defect, if the location is the same you do not need to select a new one. We will insert the automated text for the repair you select below just as we do for solitary flaps and grafts. Please note that at this time if you select a location with a different insurance zone you will need to override the ICD10 and CPT if appropriate. Closure 3 Information: This tab is for additional flaps and grafts above and beyond our usual structured repairs.  Please note if you enter information here it will not currently bill and you will need to add the billing information manually. Complex Repair Preamble Text (Leave Blank If You Do Not Want): Extensive wide undermining was performed. Intermediate Repair Preamble Text (Leave Blank If You Do Not Want): Undermining was performed with blunt dissection. Flap Thinning Complex Repair Preamble Text (Leave Blank If You Do Not Want): An incision was made along the previous flap suture line. Undermining was performed beneath the flap and redundant tissue was removed to restore the normal contour of the skin. Non-Graft Cartilage Fenestration Text: The cartilage was fenestrated with a 2mm punch biopsy to help facilitate healing. Graft Cartilage Fenestration Text: The cartilage was fenestrated with a 2mm punch biopsy to help facilitate graft survival and healing. Preparation Of Recipient Site - Flap: The eschar was removed surgically with sharp dissection to facilitate appropriate wound healing of the following adjacent tissue rearrangement. Preparation Of Recipient Site - Graft: The eschar was removed surgically with sharp dissection to facilitate appropriate survival of the following graft. Preparation Of Recipient Site - Flap Takedown: The eschar and granulation tissue was removed surgically with sharp dissection to facilitate appropriate healing after division and inset of the proximal and distal interpolation flap. Secondary Intention Text (Leave Blank If You Do Not Want): The defect will heal with secondary intention. No Repair - Repaired With Adjacent Surgical Defect Text (Leave Blank If You Do Not Want): After obtaining clear surgical margins the defect was repaired concurrently with another surgical defect which was in close approximation. Referred To Oculoplastics For Closure Text (Leave Blank If You Do Not Want): After obtaining clear surgical margins the patient was sent to oculoplastics for surgical repair.  The patient understands they will receive post-surgical care and follow-up from the referring physician's office. Referred To Otolaryngology For Closure Text (Leave Blank If You Do Not Want): After obtaining clear surgical margins the patient was sent to otolaryngology for surgical repair.  The patient understands they will receive post-surgical care and follow-up from the referring physician's office. Referred To Plastics For Closure Text (Leave Blank If You Do Not Want): After obtaining clear surgical margins the patient was sent to plastics for surgical repair.  The patient understands they will receive post-surgical care and follow-up from the referring physician's office. Referred To Asc For Closure Text (Leave Blank If You Do Not Want): After obtaining clear surgical margins the patient was sent to an ASC for surgical repair.  The patient understands they will receive post-surgical care and follow-up from the ASC physician. Referred To Mid-Level For Closure Text (Leave Blank If You Do Not Want): After obtaining clear surgical margins the patient was sent to a mid-level provider for surgical repair.  The patient understands they will receive post-surgical care and follow-up from the mid-level provider. Repair Performed By Another Provider Text (Leave Blank If You Do Not Want): After obtaining clear surgical margins the defect was repaired by another provider. Adjacent Tissue Transfer Text: The defect edges were debeveled with a #15 scalpel blade. Given the location of the defect and the proximity to free margins an adjacent tissue transfer was deemed most appropriate. Using a sterile surgical marker, an appropriate flap was drawn incorporating the defect and placing the expected incisions within the relaxed skin tension lines where possible. The area thus outlined was incised deep to adipose tissue with a #15 scalpel blade. The skin margins were undermined to an appropriate distance in all directions utilizing iris scissors and carried over to close the primary defect. Advancement Flap (Single) Text: The defect edges were debeveled with a #15 scalpel blade. Given the location of the defect and the proximity to free margins a single advancement flap was deemed most appropriate. Using a sterile surgical marker, an appropriate advancement flap was drawn incorporating the defect and placing the expected incisions within the relaxed skin tension lines where possible. The area thus outlined was incised deep to adipose tissue with a #15 scalpel blade. The skin margins were undermined to an appropriate distance in all directions utilizing iris scissors. Following this, the designed flap was advanced and carried over into the primary defect and sutured into place. Advancement Flap (Double) Text: The defect edges were debeveled with a #15 scalpel blade. Given the location of the defect and the proximity to free margins a double advancement flap was deemed most appropriate. Using a sterile surgical marker, the appropriate advancement flaps were drawn incorporating the defect and placing the expected incisions within the relaxed skin tension lines where possible. The area thus outlined was incised deep to adipose tissue with a #15 scalpel blade. The skin margins were undermined to an appropriate distance in all directions utilizing iris scissors. Following this, the designed flaps were advanced and carried over into the primary defect and sutured into place. Burow's Advancement Flap Text: The defect edges were debeveled with a #15 scalpel blade. Given the location of the defect and the proximity to free margins a Burow's advancement flap was deemed most appropriate. Using a sterile surgical marker, the appropriate advancement flap was drawn incorporating the defect and placing the expected incisions within the relaxed skin tension lines where possible. The area thus outlined was incised deep to adipose tissue with a #15 scalpel blade. The skin margins were undermined to an appropriate distance in all directions utilizing iris scissors. Following this, the designed flap was advanced and carried over into the primary defect and sutured into place. Chonodrocutaneous Helical Advancement Flap Text: The defect edges were debeveled with a #15 scalpel blade. Given the location of the defect and the proximity to free margins a chondrocutaneous helical advancement flap was deemed most appropriate. Using a sterile surgical marker, the appropriate advancement flap was drawn incorporating the defect and placing the expected incisions within the relaxed skin tension lines where possible. The area thus outlined was incised deep to adipose tissue with a #15 scalpel blade. The skin margins were undermined to an appropriate distance in all directions utilizing iris scissors. Following this, the designed flap was advanced and carried over into the primary defect and sutured into place. Crescentic Advancement Flap Text: The defect edges were debeveled with a #15 scalpel blade. Given the location of the defect and the proximity to free margins a crescentic advancement flap was deemed most appropriate. Using a sterile surgical marker, the appropriate advancement flap was drawn incorporating the defect and placing the expected incisions within the relaxed skin tension lines where possible. The area thus outlined was incised deep to adipose tissue with a #15 scalpel blade. The skin margins were undermined to an appropriate distance in all directions utilizing iris scissors. Following this, the designed flap was advanced and carried over into the primary defect and sutured into place. A-T Advancement Flap Text: The defect edges were debeveled with a #15 scalpel blade. Given the location of the defect, shape of the defect and the proximity to free margins an A-T advancement flap was deemed most appropriate. Using a sterile surgical marker, an appropriate advancement flap was drawn incorporating the defect and placing the expected incisions within the relaxed skin tension lines where possible. The area thus outlined was incised deep to adipose tissue with a #15 scalpel blade. The skin margins were undermined to an appropriate distance in all directions utilizing iris scissors. Following this, the designed flap was advanced and carried over into the primary defect and sutured into place. O-T Advancement Flap Text: The defect edges were debeveled with a #15 scalpel blade. Given the location of the defect, shape of the defect and the proximity to free margins an O-T advancement flap was deemed most appropriate. Using a sterile surgical marker, an appropriate advancement flap was drawn incorporating the defect and placing the expected incisions within the relaxed skin tension lines where possible. The area thus outlined was incised deep to adipose tissue with a #15 scalpel blade. The skin margins were undermined to an appropriate distance in all directions utilizing iris scissors. Following this, the designed flap was advanced and carried over into the primary defect and sutured into place. O-L Flap Text: The defect edges were debeveled with a #15 scalpel blade. Given the location of the defect, shape of the defect and the proximity to free margins an O-L flap was deemed most appropriate. Using a sterile surgical marker, an appropriate advancement flap was drawn incorporating the defect and placing the expected incisions within the relaxed skin tension lines where possible. The area thus outlined was incised deep to adipose tissue with a #15 scalpel blade. The skin margins were undermined to an appropriate distance in all directions utilizing iris scissors. Following this, the designed flap was advanced and carried over into the primary defect and sutured into place. O-Z Flap Text: The defect edges were debeveled with a #15 scalpel blade. Given the location of the defect, shape of the defect and the proximity to free margins an O-Z flap was deemed most appropriate. Using a sterile surgical marker, an appropriate transposition flap was drawn incorporating the defect and placing the expected incisions within the relaxed skin tension lines where possible. The area thus outlined was incised deep to adipose tissue with a #15 scalpel blade. The skin margins were undermined to an appropriate distance in all directions utilizing iris scissors. Following this, the designed flap was carried over into the primary defect and sutured into place. Double O-Z Flap Text: The defect edges were debeveled with a #15 scalpel blade. Given the location of the defect, shape of the defect and the proximity to free margins a Double O-Z flap was deemed most appropriate. Using a sterile surgical marker, an appropriate transposition flap was drawn incorporating the defect and placing the expected incisions within the relaxed skin tension lines where possible. The area thus outlined was incised deep to adipose tissue with a #15 scalpel blade. The skin margins were undermined to an appropriate distance in all directions utilizing iris scissors. Following this, the designed flap was carried over into the primary defect and sutured into place. V-Y Flap Text: The defect edges were debeveled with a #15 scalpel blade. Given the location of the defect, shape of the defect and the proximity to free margins a V-Y flap was deemed most appropriate. Using a sterile surgical marker, an appropriate advancement flap was drawn incorporating the defect and placing the expected incisions within the relaxed skin tension lines where possible. The area thus outlined was incised deep to adipose tissue with a #15 scalpel blade. The skin margins were undermined to an appropriate distance in all directions utilizing iris scissors. Following this, the designed flap was advanced and carried over into the primary defect and sutured into place. Advancement-Rotation Flap Text: The defect edges were debeveled with a #15 scalpel blade. Given the location of the defect, shape of the defect and the proximity to free margins an advancement-rotation flap was deemed most appropriate. Using a sterile surgical marker, an appropriate flap was drawn incorporating the defect and placing the expected incisions within the relaxed skin tension lines where possible. The area thus outlined was incised deep to adipose tissue with a #15 scalpel blade. The skin margins were undermined to an appropriate distance in all directions utilizing iris scissors. Following this, the designed flap was carried over into the primary defect and sutured into place. Mercedes Flap Text: The defect edges were debeveled with a #15 scalpel blade. Given the location of the defect, shape of the defect and the proximity to free margins a Mercedes flap was deemed most appropriate. Using a sterile surgical marker, an appropriate advancement flap was drawn incorporating the defect and placing the expected incisions within the relaxed skin tension lines where possible. The area thus outlined was incised deep to adipose tissue with a #15 scalpel blade. The skin margins were undermined to an appropriate distance in all directions utilizing iris scissors. Following this, the designed flap was advanced and carried over into the primary defect and sutured into place. Modified Advancement Flap Text: The defect edges were debeveled with a #15 scalpel blade. Given the location of the defect, shape of the defect and the proximity to free margins a modified advancement flap was deemed most appropriate. Using a sterile surgical marker, an appropriate advancement flap was drawn incorporating the defect and placing the expected incisions within the relaxed skin tension lines where possible. The area thus outlined was incised deep to adipose tissue with a #15 scalpel blade. The skin margins were undermined to an appropriate distance in all directions utilizing iris scissors. Following this, the designed flap was advanced and carried over into the primary defect and sutured into place. Mucosal Advancement Flap Text: Given the location of the defect, shape of the defect and the proximity to free margins a mucosal advancement flap was deemed most appropriate. Incisions were made with a 15 blade scalpel in the appropriate fashion along the cutaneous vermilion border and the mucosal lip. The remaining actinically damaged mucosal tissue was excised.  The mucosal advancement flap was then elevated to the gingival sulcus with care taken to preserve the neurovascular structures and advanced into the primary defect. Care was taken to ensure that precise realignment of the vermilion border was achieved. Peng Advancement Flap Text: The defect edges were debeveled with a #15 scalpel blade. Given the location of the defect, shape of the defect and the proximity to free margins a Peng advancement flap was deemed most appropriate. Using a sterile surgical marker, an appropriate advancement flap was drawn incorporating the defect and placing the expected incisions within the relaxed skin tension lines where possible. The area thus outlined was incised deep to adipose tissue with a #15 scalpel blade. The skin margins were undermined to an appropriate distance in all directions utilizing iris scissors. Following this, the designed flap was advanced and carried over into the primary defect and sutured into place. Hatchet Flap Text: The defect edges were debeveled with a #15 scalpel blade. Given the location of the defect, shape of the defect and the proximity to free margins a hatchet flap was deemed most appropriate. Using a sterile surgical marker, an appropriate hatchet flap was drawn incorporating the defect and placing the expected incisions within the relaxed skin tension lines where possible. The area thus outlined was incised deep to adipose tissue with a #15 scalpel blade. The skin margins were undermined to an appropriate distance in all directions utilizing iris scissors. Following this, the designed flap was carried over into the primary defect and sutured into place. Rotation Flap Text: The defect edges were debeveled with a #15 scalpel blade. Given the location of the defect, shape of the defect and the proximity to free margins a rotation flap was deemed most appropriate. Using a sterile surgical marker, an appropriate rotation flap was drawn incorporating the defect and placing the expected incisions within the relaxed skin tension lines where possible. The area thus outlined was incised deep to adipose tissue with a #15 scalpel blade. The skin margins were undermined to an appropriate distance in all directions utilizing iris scissors. Following this, the designed flap was carried over into the primary defect and sutured into place. Bilateral Rotation Flap Text: The defect edges were debeveled with a #15 scalpel blade. Given the location of the defect, shape of the defect and the proximity to free margins a bilateral rotation flap was deemed most appropriate. Using a sterile surgical marker, an appropriate rotation flap was drawn incorporating the defect and placing the expected incisions within the relaxed skin tension lines where possible. The area thus outlined was incised deep to adipose tissue with a #15 scalpel blade. The skin margins were undermined to an appropriate distance in all directions utilizing iris scissors. Following this, the designed flap was carried over into the primary defect and sutured into place. Spiral Flap Text: The defect edges were debeveled with a #15 scalpel blade. Given the location of the defect, shape of the defect and the proximity to free margins a spiral flap was deemed most appropriate. Using a sterile surgical marker, an appropriate rotation flap was drawn incorporating the defect and placing the expected incisions within the relaxed skin tension lines where possible. The area thus outlined was incised deep to adipose tissue with a #15 scalpel blade. The skin margins were undermined to an appropriate distance in all directions utilizing iris scissors. Following this, the designed flap was carried over into the primary defect and sutured into place. Staged Advancement Flap Text: The defect edges were debeveled with a #15 scalpel blade. Given the location of the defect, shape of the defect and the proximity to free margins a staged advancement flap was deemed most appropriate. Using a sterile surgical marker, an appropriate advancement flap was drawn incorporating the defect and placing the expected incisions within the relaxed skin tension lines where possible. The area thus outlined was incised deep to adipose tissue with a #15 scalpel blade. The skin margins were undermined to an appropriate distance in all directions utilizing iris scissors. Following this, the designed flap was carried over into the primary defect and sutured into place. Star Wedge Flap Text: The defect edges were debeveled with a #15 scalpel blade. Given the location of the defect, shape of the defect and the proximity to free margins a star wedge flap was deemed most appropriate. Using a sterile surgical marker, an appropriate rotation flap was drawn incorporating the defect and placing the expected incisions within the relaxed skin tension lines where possible. The area thus outlined was incised deep to adipose tissue with a #15 scalpel blade. The skin margins were undermined to an appropriate distance in all directions utilizing iris scissors. Following this, the designed flap was carried over into the primary defect and sutured into place. Transposition Flap Text: The defect edges were debeveled with a #15 scalpel blade. Given the location of the defect and the proximity to free margins a transposition flap was deemed most appropriate. Using a sterile surgical marker, an appropriate transposition flap was drawn incorporating the defect. The area thus outlined was incised deep to adipose tissue with a #15 scalpel blade. The skin margins were undermined to an appropriate distance in all directions utilizing iris scissors. Following this, the designed flap was carried over into the primary defect and sutured into place. Muscle Hinge Flap Text: The defect edges were debeveled with a #15 scalpel blade.  Given the size, depth and location of the defect and the proximity to free margins a muscle hinge flap was deemed most appropriate. Using a sterile surgical marker, an appropriate hinge flap was drawn incorporating the defect. The area thus outlined was incised with a #15 scalpel blade. The skin margins were undermined to an appropriate distance in all directions utilizing iris scissors. Following this, the designed flap was carried into the primary defect and sutured into place. Mustarde Flap Text: The defect edges were debeveled with a #15 scalpel blade.  Given the size, depth and location of the defect and the proximity to free margins a Mustarde flap was deemed most appropriate. Using a sterile surgical marker, an appropriate flap was drawn incorporating the defect. The area thus outlined was incised with a #15 scalpel blade. The skin margins were undermined to an appropriate distance in all directions utilizing iris scissors. Following this, the designed flap was carried into the primary defect and sutured into place. Nasal Turnover Hinge Flap Text: The defect edges were debeveled with a #15 scalpel blade.  Given the size, depth, location of the defect and the defect being full thickness a nasal turnover hinge flap was deemed most appropriate. Using a sterile surgical marker, an appropriate hinge flap was drawn incorporating the defect. The area thus outlined was incised with a #15 scalpel blade. The flap was designed to recreate the nasal mucosal lining and the alar rim. The skin margins were undermined to an appropriate distance in all directions utilizing iris scissors. Following this, the designed flap was carried over into the primary defect and sutured into place Nasalis-Muscle-Based Myocutaneous Island Pedicle Flap Text: Using a #15 blade, an incision was made around the donor flap to the level of the nasalis muscle. Wide lateral undermining was then performed in both the subcutaneous plane above the nasalis muscle, and in a submuscular plane just above periosteum. This allowed the formation of a free nasalis muscle axial pedicle (based on the angular artery) which was still attached to the actual cutaneous flap, increasing its mobility and vascular viability. Hemostasis was obtained with pinpoint electrocoagulation. The flap was mobilized into position and the pivotal anchor points positioned and stabilized with buried interrupted sutures. Subcutaneous and dermal tissues were closed in a multilayered fashion with sutures. Tissue redundancies were excised, and the epidermal edges were apposed without significant tension and sutured with sutures. Nasalis Myocutaneous Flap Text: Using a #15 blade, an incision was made around the donor flap to the level of the nasalis muscle. Wide lateral undermining was then performed in both the subcutaneous plane above the nasalis muscle, and in a submuscular plane just above periosteum. This allowed the formation of a free nasalis muscle axial pedicle which was still attached to the actual cutaneous flap, increasing its mobility and vascular viability. Hemostasis was obtained with pinpoint electrocoagulation. The flap was mobilized into position and the pivotal anchor points positioned and stabilized with buried interrupted sutures. Subcutaneous and dermal tissues were closed in a multilayered fashion with sutures. Tissue redundancies were excised, and the epidermal edges were apposed without significant tension and sutured with sutures. Nasolabial Transposition Flap Text: The defect edges were debeveled with a #15 scalpel blade.  Given the size, depth and location of the defect and the proximity to free margins a nasolabial transposition flap was deemed most appropriate. Using a sterile surgical marker, an appropriate flap was drawn incorporating the defect. The area thus outlined was incised with a #15 scalpel blade. The skin margins were undermined to an appropriate distance in all directions utilizing iris scissors. Following this, the designed flap was carried into the primary defect and sutured into place. Orbicularis Oris Muscle Flap Text: The defect edges were debeveled with a #15 scalpel blade.  Given that the defect affected the competency of the oral sphincter an orbicularis oris muscle flap was deemed most appropriate to restore this competency and normal muscle function.  Using a sterile surgical marker, an appropriate flap was drawn incorporating the defect. The area thus outlined was incised with a #15 scalpel blade. Following this, the designed flap was carried over into the primary defect and sutured into place. Melolabial Transposition Flap Text: The defect edges were debeveled with a #15 scalpel blade. Given the location of the defect and the proximity to free margins a melolabial flap was deemed most appropriate. Using a sterile surgical marker, an appropriate melolabial transposition flap was drawn incorporating the defect. The area thus outlined was incised deep to adipose tissue with a #15 scalpel blade. The skin margins were undermined to an appropriate distance in all directions utilizing iris scissors. Following this, the designed flap was carried over into the primary defect and sutured into place. Rectangular Flap Text: The defect edges were debeveled with a #15 scalpel blade. Given the location of the defect and the proximity to free margins a rectangular flap was deemed most appropriate. Using a sterile surgical marker, an appropriate rectangular flap was drawn incorporating the defect. The area thus outlined was incised deep to adipose tissue with a #15 scalpel blade. The skin margins were undermined to an appropriate distance in all directions utilizing iris scissors. Following this, the designed flap was carried over into the primary defect and sutured into place. Rhombic Flap Text: The defect edges were debeveled with a #15 scalpel blade. Given the location of the defect and the proximity to free margins a rhombic flap was deemed most appropriate. Using a sterile surgical marker, an appropriate rhombic flap was drawn incorporating the defect. The area thus outlined was incised deep to adipose tissue with a #15 scalpel blade. The skin margins were undermined to an appropriate distance in all directions utilizing iris scissors. Following this, the designed flap was carried over into the primary defect and sutured into place. Rhomboid Transposition Flap Text: The defect edges were debeveled with a #15 scalpel blade. Given the location of the defect and the proximity to free margins a rhomboid transposition flap was deemed most appropriate. Using a sterile surgical marker, an appropriate rhomboid flap was drawn incorporating the defect. The area thus outlined was incised deep to adipose tissue with a #15 scalpel blade. The skin margins were undermined to an appropriate distance in all directions utilizing iris scissors. Following this, the designed flap was carried over into the primary defect and sutured into place. Bi-Rhombic Flap Text: The defect edges were debeveled with a #15 scalpel blade. Given the location of the defect and the proximity to free margins a bi-rhombic flap was deemed most appropriate. Using a sterile surgical marker, an appropriate rhombic flap was drawn incorporating the defect. The area thus outlined was incised deep to adipose tissue with a #15 scalpel blade. The skin margins were undermined to an appropriate distance in all directions utilizing iris scissors. Following this, the designed flap was carried over into the primary defect and sutured into place. Helical Rim Advancement Flap Text: The defect edges were debeveled with a #15 blade scalpel.  Given the location of the defect and the proximity to free margins (helical rim) a double helical rim advancement flap was deemed most appropriate. Using a sterile surgical marker, the appropriate advancement flaps were drawn incorporating the defect and placing the expected incisions between the helical rim and antihelix where possible.  The area thus outlined was incised through and through with a #15 scalpel blade.  With a skin hook and iris scissors, the flaps were gently and sharply undermined and freed up. Folllowing this, the designed flaps were carried over into the primary defect and sutured into place. Bilateral Helical Rim Advancement Flap Text: The defect edges were debeveled with a #15 blade scalpel.  Given the location of the defect and the proximity to free margins (helical rim) a bilateral helical rim advancement flap was deemed most appropriate. Using a sterile surgical marker, the appropriate advancement flaps were drawn incorporating the defect and placing the expected incisions between the helical rim and antihelix where possible.  The area thus outlined was incised through and through with a #15 scalpel blade.  With a skin hook and iris scissors, the flaps were gently and sharply undermined and freed up. Following this, the designed flaps were placed into the primary defect and sutured into place. Ear Star Wedge Flap Text: The defect edges were debeveled with a #15 blade scalpel.  Given the location of the defect and the proximity to free margins (helical rim) an ear star wedge flap was deemed most appropriate. Using a sterile surgical marker, the appropriate flap was drawn incorporating the defect and placing the expected incisions between the helical rim and antihelix where possible.  The area thus outlined was incised through and through with a #15 scalpel blade. Following this, the designed flap was carried over into the primary defect and sutured into place. Banner Transposition Flap Text: The defect edges were debeveled with a #15 scalpel blade. Given the location of the defect and the proximity to free margins a Banner transposition flap was deemed most appropriate. Using a sterile surgical marker, an appropriate flap was drawn around the defect. The area thus outlined was incised deep to adipose tissue with a #15 scalpel blade. The skin margins were undermined to an appropriate distance in all directions utilizing iris scissors. Following this, the designed flap was carried into the primary defect and sutured into place. Bilobed Flap Text: The defect edges were debeveled with a #15 scalpel blade. Given the location of the defect and the proximity to free margins a bilobe flap was deemed most appropriate. Using a sterile surgical marker, an appropriate bilobe flap drawn around the defect. The area thus outlined was incised deep to adipose tissue with a #15 scalpel blade. The skin margins were undermined to an appropriate distance in all directions utilizing iris scissors. Following this, the designed flap was carried over into the primary defect and sutured into place. Bilobed Transposition Flap Text: The defect edges were debeveled with a #15 scalpel blade. Given the location of the defect and the proximity to free margins a bilobed transposition flap was deemed most appropriate. Using a sterile surgical marker, an appropriate bilobe flap drawn around the defect. The area thus outlined was incised deep to adipose tissue with a #15 scalpel blade. The skin margins were undermined to an appropriate distance in all directions utilizing iris scissors. Following this, the designed flap was carried over into the primary defect and sutured into place. Trilobed Flap Text: The defect edges were debeveled with a #15 scalpel blade. Given the location of the defect and the proximity to free margins a trilobed flap was deemed most appropriate. Using a sterile surgical marker, an appropriate trilobed flap was drawn around the defect. The area thus outlined was incised deep to adipose tissue with a #15 scalpel blade. The skin margins were undermined to an appropriate distance in all directions utilizing iris scissors. Following this, the designed flap was carried into the primary defect and sutured into place. Dorsal Nasal Flap Text: The defect edges were debeveled with a #15 scalpel blade. Given the location of the defect and the proximity to free margins a dorsal nasal flap was deemed most appropriate. Using a sterile surgical marker, an appropriate dorsal nasal flap was drawn around the defect. The area thus outlined was incised deep to adipose tissue with a #15 scalpel blade. The skin margins were undermined to an appropriate distance in all directions utilizing iris scissors. Following this, the designed flap was carried into the primary defect and sutured into place. Island Pedicle Flap Text: The defect edges were debeveled with a #15 scalpel blade. Given the location of the defect, shape of the defect and the proximity to free margins an island pedicle advancement flap was deemed most appropriate. Using a sterile surgical marker, an appropriate advancement flap was drawn incorporating the defect, outlining the appropriate donor tissue and placing the expected incisions within the relaxed skin tension lines where possible. The area thus outlined was incised deep to adipose tissue with a #15 scalpel blade. The skin margins were undermined to an appropriate distance in all directions around the primary defect and laterally outward around the island pedicle utilizing iris scissors.  There was minimal undermining beneath the pedicle flap. Following this, the flap was carried over into the primary defect and sutured into place. Island Pedicle Flap With Canthal Suspension Text: The defect edges were debeveled with a #15 scalpel blade. Given the location of the defect, shape of the defect and the proximity to free margins an island pedicle advancement flap was deemed most appropriate. Using a sterile surgical marker, an appropriate advancement flap was drawn incorporating the defect, outlining the appropriate donor tissue and placing the expected incisions within the relaxed skin tension lines where possible. The area thus outlined was incised deep to adipose tissue with a #15 scalpel blade. The skin margins were undermined to an appropriate distance in all directions around the primary defect and laterally outward around the island pedicle utilizing iris scissors.  There was minimal undermining beneath the pedicle flap. A suspension suture was placed in the canthal tendon to prevent tension and prevent ectropion. Following this, the designed flap was placed into the primary defect and sutured into place. Alar Island Pedicle Flap Text: The defect edges were debeveled with a #15 scalpel blade. Given the location of the defect, shape of the defect and the proximity to the alar rim an island pedicle advancement flap was deemed most appropriate. Using a sterile surgical marker, an appropriate advancement flap was drawn incorporating the defect, outlining the appropriate donor tissue and placing the expected incisions within the nasal ala running parallel to the alar rim. The area thus outlined was incised with a #15 scalpel blade. The skin margins were undermined minimally to an appropriate distance in all directions around the primary defect and laterally outward around the island pedicle utilizing iris scissors.  There was minimal undermining beneath the pedicle flap. Following this, the designed flap was carried over into the primary defect and sutured into place. Double Island Pedicle Flap Text: The defect edges were debeveled with a #15 scalpel blade. Given the location of the defect, shape of the defect and the proximity to free margins a double island pedicle advancement flap was deemed most appropriate. Using a sterile surgical marker, an appropriate advancement flap was drawn incorporating the defect, outlining the appropriate donor tissue and placing the expected incisions within the relaxed skin tension lines where possible. The area thus outlined was incised deep to adipose tissue with a #15 scalpel blade. The skin margins were undermined to an appropriate distance in all directions around the primary defect and laterally outward around the island pedicle utilizing iris scissors.  There was minimal undermining beneath the pedicle flap. Following this, the flap was carried over into the primary defect and sutured into place. Island Pedicle Flap-Requiring Vessel Identification Text: The defect edges were debeveled with a #15 scalpel blade. Given the location of the defect, shape of the defect and the proximity to free margins an island pedicle advancement flap was deemed most appropriate. Using a sterile surgical marker, an appropriate advancement flap was drawn, based on the axial vessel mentioned above, incorporating the defect, outlining the appropriate donor tissue and placing the expected incisions within the relaxed skin tension lines where possible. The area thus outlined was incised deep to adipose tissue with a #15 scalpel blade. The skin margins were undermined to an appropriate distance in all directions around the primary defect and laterally outward around the island pedicle utilizing iris scissors.  There was minimal undermining beneath the pedicle flap. Following this, the designed flap was carried over into the primary defect and sutured into place. Keystone Flap Text: The defect edges were debeveled with a #15 scalpel blade. Given the location of the defect, shape of the defect a keystone flap was deemed most appropriate. Using a sterile surgical marker, an appropriate keystone flap was drawn incorporating the defect, outlining the appropriate donor tissue and placing the expected incisions within the relaxed skin tension lines where possible. The area thus outlined was incised deep to adipose tissue with a #15 scalpel blade. The skin margins were undermined to an appropriate distance in all directions around the primary defect and laterally outward around the flap utilizing iris scissors. Following this, the designed flap was carried into the primary defect and sutured into place. O-T Plasty Text: The defect edges were debeveled with a #15 scalpel blade. Given the location of the defect, shape of the defect and the proximity to free margins an O-T plasty was deemed most appropriate. Using a sterile surgical marker, an appropriate O-T plasty was drawn incorporating the defect and placing the expected incisions within the relaxed skin tension lines where possible. The area thus outlined was incised deep to adipose tissue with a #15 scalpel blade. The skin margins were undermined to an appropriate distance in all directions utilizing iris scissors. Following this, the designed flap was carried over into the primary defect and sutured into place. O-Z Plasty Text: The defect edges were debeveled with a #15 scalpel blade. Given the location of the defect, shape of the defect and the proximity to free margins an O-Z plasty (double transposition flap) was deemed most appropriate. Using a sterile surgical marker, the appropriate transposition flaps were drawn incorporating the defect and placing the expected incisions within the relaxed skin tension lines where possible. The area thus outlined was incised deep to adipose tissue with a #15 scalpel blade. The skin margins were undermined to an appropriate distance in all directions utilizing iris scissors. Hemostasis was achieved with electrocautery. The flaps were then transposed and carried over into place, one clockwise and the other counterclockwise, and anchored with interrupted buried subcutaneous sutures. Double O-Z Plasty Text: The defect edges were debeveled with a #15 scalpel blade. Given the location of the defect, shape of the defect and the proximity to free margins a Double O-Z plasty (double transposition flap) was deemed most appropriate. Using a sterile surgical marker, the appropriate transposition flaps were drawn incorporating the defect and placing the expected incisions within the relaxed skin tension lines where possible. The area thus outlined was incised deep to adipose tissue with a #15 scalpel blade. The skin margins were undermined to an appropriate distance in all directions utilizing iris scissors. Hemostasis was achieved with electrocautery. The flaps were then transposed and carried over into place, one clockwise and the other counterclockwise, and anchored with interrupted buried subcutaneous sutures. V-Y Plasty Text: The defect edges were debeveled with a #15 scalpel blade. Given the location of the defect, shape of the defect and the proximity to free margins an V-Y advancement flap was deemed most appropriate. Using a sterile surgical marker, an appropriate advancement flap was drawn incorporating the defect and placing the expected incisions within the relaxed skin tension lines where possible. The area thus outlined was incised deep to adipose tissue with a #15 scalpel blade. The skin margins were undermined to an appropriate distance in all directions utilizing iris scissors. Following this, the designed flap was advanced and carried over into the primary defect and sutured into place. H Plasty Text: Given the location of the defect, shape of the defect and the proximity to free margins a H-plasty was deemed most appropriate for repair. Using a sterile surgical marker, the appropriate advancement arms of the H-plasty were drawn incorporating the defect and placing the expected incisions within the relaxed skin tension lines where possible. The area thus outlined was incised deep to adipose tissue with a #15 scalpel blade. The skin margins were undermined to an appropriate distance in all directions utilizing iris scissors.  The opposing advancement arms were then advanced and carried over into place in opposite direction and anchored with interrupted buried subcutaneous sutures. W Plasty Text: The lesion was extirpated to the level of the fat with a #15 scalpel blade. Given the location of the defect, shape of the defect and the proximity to free margins a W-plasty was deemed most appropriate for repair. Using a sterile surgical marker, the appropriate transposition arms of the W-plasty were drawn incorporating the defect and placing the expected incisions within the relaxed skin tension lines where possible. The area thus outlined was incised deep to adipose tissue with a #15 scalpel blade. The skin margins were undermined to an appropriate distance in all directions utilizing iris scissors. The opposing transposition arms were then transposed and carried over into place in opposite direction and anchored with interrupted buried subcutaneous sutures. Z Plasty Text: The lesion was extirpated to the level of the fat with a #15 scalpel blade. Given the location of the defect, shape of the defect and the proximity to free margins a Z-plasty was deemed most appropriate for repair. Using a sterile surgical marker, the appropriate transposition arms of the Z-plasty were drawn incorporating the defect and placing the expected incisions within the relaxed skin tension lines where possible. The area thus outlined was incised deep to adipose tissue with a #15 scalpel blade. The skin margins were undermined to an appropriate distance in all directions utilizing iris scissors. The opposing transposition arms were then transposed and carried over into place in opposite direction and anchored with interrupted buried subcutaneous sutures. Double Z Plasty Text: The lesion was extirpated to the level of the fat with a #15 scalpel blade. Given the location of the defect, shape of the defect and the proximity to free margins a double Z-plasty was deemed most appropriate for repair. Using a sterile surgical marker, the appropriate transposition arms of the double Z-plasty were drawn incorporating the defect and placing the expected incisions within the relaxed skin tension lines where possible. The area thus outlined was incised deep to adipose tissue with a #15 scalpel blade. The skin margins were undermined to an appropriate distance in all directions utilizing iris scissors. The opposing transposition arms were then transposed and carried over into place in opposite direction and anchored with interrupted buried subcutaneous sutures. Zygomaticofacial Flap Text: Given the location of the defect, shape of the defect and the proximity to free margins a zygomaticofacial flap was deemed most appropriate for repair. Using a sterile surgical marker, the appropriate flap was drawn incorporating the defect and placing the expected incisions within the relaxed skin tension lines where possible. The area thus outlined was incised deep to adipose tissue with a #15 scalpel blade with preservation of a vascular pedicle.  The skin margins were undermined to an appropriate distance in all directions utilizing iris scissors. The flap was then carried over into the defect and anchored with interrupted buried subcutaneous sutures. Cheek Interpolation Flap Text: A decision was made to reconstruct the defect utilizing an interpolation axial flap and a staged reconstruction.  A telfa template was made of the defect.  This telfa template was then used to outline the Cheek Interpolation flap.  The donor area for the pedicle flap was then injected with anesthesia.  The flap was excised through the skin and subcutaneous tissue down to the layer of the underlying musculature.  The interpolation flap was carefully excised within this deep plane to maintain its blood supply.  The edges of the donor site were undermined.   The donor site was closed in a primary fashion.  The pedicle was then rotated into position and sutured.  Once the tube was sutured into place, adequate blood supply was confirmed with blanching and refill.  The pedicle was then wrapped with xeroform gauze and dressed appropriately with a telfa and gauze bandage to ensure continued blood supply and protect the attached pedicle. Cheek-To-Nose Interpolation Flap Text: A decision was made to reconstruct the defect utilizing an interpolation axial flap and a staged reconstruction.  A telfa template was made of the defect.  This telfa template was then used to outline the Cheek-To-Nose Interpolation flap.  The donor area for the pedicle flap was then injected with anesthesia.  The flap was excised through the skin and subcutaneous tissue down to the layer of the underlying musculature.  The interpolation flap was carefully excised within this deep plane to maintain its blood supply.  The edges of the donor site were undermined.   The donor site was closed in a primary fashion.  The pedicle was then rotated into position and sutured.  Once the tube was sutured into place, adequate blood supply was confirmed with blanching and refill.  The pedicle was then wrapped with xeroform gauze and dressed appropriately with a telfa and gauze bandage to ensure continued blood supply and protect the attached pedicle. Interpolation Flap Text: A decision was made to reconstruct the defect utilizing an interpolation axial flap and a staged reconstruction.  A telfa template was made of the defect.  This telfa template was then used to outline the interpolation flap.  The donor area for the pedicle flap was then injected with anesthesia.  The flap was excised through the skin and subcutaneous tissue down to the layer of the underlying musculature.  The interpolation flap was carefully excised within this deep plane to maintain its blood supply.  The edges of the donor site were undermined.   The donor site was closed in a primary fashion.  The pedicle was then rotated into position and sutured.  Once the tube was sutured into place, adequate blood supply was confirmed with blanching and refill.  The pedicle was then wrapped with xeroform gauze and dressed appropriately with a telfa and gauze bandage to ensure continued blood supply and protect the attached pedicle. Melolabial Interpolation Flap Text: A decision was made to reconstruct the defect utilizing an interpolation axial flap and a staged reconstruction.  A telfa template was made of the defect.  This telfa template was then used to outline the melolabial interpolation flap.  The donor area for the pedicle flap was then injected with anesthesia.  The flap was excised through the skin and subcutaneous tissue down to the layer of the underlying musculature.  The pedicle flap was carefully excised within this deep plane to maintain its blood supply.  The edges of the donor site were undermined.   The donor site was closed in a primary fashion.  The pedicle was then rotated into position and sutured.  Once the tube was sutured into place, adequate blood supply was confirmed with blanching and refill.  The pedicle was then wrapped with xeroform gauze and dressed appropriately with a telfa and gauze bandage to ensure continued blood supply and protect the attached pedicle. Mastoid Interpolation Flap Text: A decision was made to reconstruct the defect utilizing an interpolation axial flap and a staged reconstruction.  A telfa template was made of the defect.  This telfa template was then used to outline the mastoid interpolation flap.  The donor area for the pedicle flap was then injected with anesthesia.  The flap was excised through the skin and subcutaneous tissue down to the layer of the underlying musculature.  The pedicle flap was carefully excised within this deep plane to maintain its blood supply.  The edges of the donor site were undermined.   The donor site was closed in a primary fashion.  The pedicle was then rotated into position and sutured.  Once the tube was sutured into place, adequate blood supply was confirmed with blanching and refill.  The pedicle was then wrapped with xeroform gauze and dressed appropriately with a telfa and gauze bandage to ensure continued blood supply and protect the attached pedicle. Posterior Auricular Interpolation Flap Text: A decision was made to reconstruct the defect utilizing an interpolation axial flap and a staged reconstruction.  A telfa template was made of the defect.  This telfa template was then used to outline the posterior auricular interpolation flap.  The donor area for the pedicle flap was then injected with anesthesia.  The flap was excised through the skin and subcutaneous tissue down to the layer of the underlying musculature.  The pedicle flap was carefully excised within this deep plane to maintain its blood supply.  The edges of the donor site were undermined.   The donor site was closed in a primary fashion.  The pedicle was then rotated into position and sutured.  Once the tube was sutured into place, adequate blood supply was confirmed with blanching and refill.  The pedicle was then wrapped with xeroform gauze and dressed appropriately with a telfa and gauze bandage to ensure continued blood supply and protect the attached pedicle. Paramedian Forehead Flap Text: A decision was made to reconstruct the defect utilizing an interpolation axial flap and a staged reconstruction.  A telfa template was made of the defect.  This telfa template was then used to outline the paramedian forehead pedicle flap.  The donor area for the pedicle flap was then injected with anesthesia.  The flap was excised through the skin and subcutaneous tissue down to the layer of the underlying musculature.  The pedicle flap was carefully excised within this deep plane to maintain its blood supply.  The edges of the donor site were undermined.   The donor site was closed in a primary fashion.  The pedicle was then rotated into position and sutured.  Once the tube was sutured into place, adequate blood supply was confirmed with blanching and refill.  The pedicle was then wrapped with xeroform gauze and dressed appropriately with a telfa and gauze bandage to ensure continued blood supply and protect the attached pedicle. Abbe Flap (Upper To Lower Lip) Text: The defect of the lower lip was assessed and measured.  Given the location and size of the defect, an Abbe flap was deemed most appropriate. Using a sterile surgical marker, an appropriate Abbe flap was measured and drawn on the upper lip. Local anesthesia was then infiltrated.  A scalpel was then used to incise the upper lip through and through the skin, vermilion, muscle and mucosa, leaving the flap pedicled on the opposite side.  The flap was then rotated and transferred to the lower lip defect.  The flap was then sutured into place with a three layer technique, closing the orbicularis oris muscle layer with subcutaneous buried sutures, followed by a mucosal layer and an epidermal layer. Abbe Flap (Lower To Upper Lip) Text: The defect of the upper lip was assessed and measured.  Given the location and size of the defect, an Abbe flap was deemed most appropriate. Using a sterile surgical marker, an appropriate Abbe flap was measured and drawn on the lower lip. Local anesthesia was then infiltrated. A scalpel was then used to incise the upper lip through and through the skin, vermilion, muscle and mucosa, leaving the flap pedicled on the opposite side.  The flap was then rotated and transferred to the lower lip defect.  The flap was then sutured into place with a three layer technique, closing the orbicularis oris muscle layer with subcutaneous buried sutures, followed by a mucosal layer and an epidermal layer. Estlander Flap (Upper To Lower Lip) Text: The defect of the lower lip was assessed and measured.  Given the location and size of the defect, an Estlander flap was deemed most appropriate. Using a sterile surgical marker, an appropriate Estlander flap was measured and drawn on the upper lip. Local anesthesia was then infiltrated. A scalpel was then used to incise the lateral aspect of the flap, through skin, muscle and mucosa, leaving the flap pedicled medially.  The flap was then rotated and positioned to fill the lower lip defect.  The flap was then sutured into place with a three layer technique, closing the orbicularis oris muscle layer with subcutaneous buried sutures, followed by a mucosal layer and an epidermal layer. Cheiloplasty (Less Than 50%) Text: A decision was made to reconstruct the defect with a  cheiloplasty.  The defect was undermined extensively.  Additional orbicularis oris muscle was excised with a 15 blade scalpel.  The defect was converted into a full thickness wedge, of less than 50% of the vertical height of the lip, to facilite a better cosmetic result.  Small vessels were then tied off with 5-0 monocyrl. The orbicularis oris, superficial fascia, adipose and dermis were then reapproximated.  After the deeper layers were approximated the epidermis was reapproximated with particular care given to realign the vermilion border. Cheiloplasty (Complex) Text: A decision was made to reconstruct the defect with a  cheiloplasty.  The defect was undermined extensively.  Additional orbicularis oris muscle was excised with a 15 blade scalpel.  The defect was converted into a full thickness wedge to facilite a better cosmetic result.  Small vessels were then tied off with 5-0 monocyrl. The orbicularis oris, superficial fascia, adipose and dermis were then reapproximated.  After the deeper layers were approximated the epidermis was reapproximated with particular care given to realign the vermilion border. Ear Wedge Repair Text: A wedge excision was completed by carrying down an excision through the full thickness of the ear and cartilage with an inward facing Burow's triangle. The wound was then closed in a layered fashion. Full Thickness Lip Wedge Repair (Flap) Text: Given the location of the defect and the proximity to free margins a full thickness wedge repair was deemed most appropriate. Using a sterile surgical marker, the appropriate repair was drawn incorporating the defect and placing the expected incisions perpendicular to the vermilion border.  The vermilion border was also meticulously outlined to ensure appropriate reapproximation during the repair.  The area thus outlined was incised through and through with a #15 scalpel blade.  The muscularis and dermis were reaproximated with deep sutures following hemostasis. Care was taken to realign the vermilion border before proceeding with the superficial closure.  Once the vermilion was realigned the superfical and mucosal closure was finished. Ftsg Text: The defect edges were debeveled with a #15 scalpel blade. Given the location of the defect, shape of the defect and the proximity to free margins a full thickness skin graft was deemed most appropriate. Using a sterile surgical marker, the primary defect shape was transferred to the donor site. The area thus outlined was incised deep to adipose tissue with a #15 scalpel blade.  The harvested graft was then trimmed of adipose tissue until only dermis and epidermis was left.  The skin margins of the secondary defect were undermined to an appropriate distance in all directions utilizing iris scissors.  The secondary defect was closed with interrupted buried subcutaneous sutures.  The skin edges were then re-apposed with running  sutures.  The skin graft was then placed in the primary defect and oriented appropriately. Split-Thickness Skin Graft Text: The defect edges were debeveled with a #15 scalpel blade. Given the location of the defect, shape of the defect and the proximity to free margins a split thickness skin graft was deemed most appropriate. Using a sterile surgical marker, the primary defect shape was transferred to the donor site. The split thickness graft was then harvested.  The skin graft was then placed in the primary defect and oriented appropriately. Pinch Graft Text: The defect edges were debeveled with a #15 scalpel blade. Given the location of the defect, shape of the defect and the proximity to free margins a pinch graft was deemed most appropriate. Using a sterile surgical marker, the primary defect shape was transferred to the donor site. The area thus outlined was incised deep to adipose tissue with a #15 scalpel blade.  The harvested graft was then trimmed of adipose tissue until only dermis and epidermis was left. The skin margins of the secondary defect were undermined to an appropriate distance in all directions utilizing iris scissors.  The secondary defect was closed with interrupted buried subcutaneous sutures.  The skin edges were then re-apposed with running  sutures.  The skin graft was then placed in the primary defect and oriented appropriately. Burow's Graft Text: The defect edges were debeveled with a #15 scalpel blade. Given the location of the defect, shape of the defect, the proximity to free margins and the presence of a standing cone deformity a Burow's skin graft was deemed most appropriate. The standing cone was removed and this tissue was then trimmed to the shape of the primary defect. The adipose tissue was also removed until only dermis and epidermis were left.  The skin margins of the secondary defect were undermined to an appropriate distance in all directions utilizing iris scissors.  The secondary defect was closed with interrupted buried subcutaneous sutures.  The skin edges were then re-apposed with running  sutures.  The skin graft was then placed in the primary defect and oriented appropriately. Cartilage Graft Text: The defect edges were debeveled with a #15 scalpel blade. Given the location of the defect, shape of the defect, the fact the defect involved a full thickness cartilage defect a cartilage graft was deemed most appropriate.  An appropriate donor site was identified, cleansed, and anesthetized. The cartilage graft was then harvested and transferred to the recipient site, oriented appropriately and then sutured into place.  The secondary defect was then repaired using a primary closure. Composite Graft Text: The defect edges were debeveled with a #15 scalpel blade. Given the location of the defect, shape of the defect, the proximity to free margins and the fact the defect was full thickness a composite graft was deemed most appropriate.  The defect was outline and then transferred to the donor site.  A full thickness graft was then excised from the donor site. The graft was then placed in the primary defect, oriented appropriately and then sutured into place.  The secondary defect was then repaired using a primary closure. Epidermal Autograft Text: The defect edges were debeveled with a #15 scalpel blade. Given the location of the defect, shape of the defect and the proximity to free margins an epidermal autograft was deemed most appropriate. Using a sterile surgical marker, the primary defect shape was transferred to the donor site. The epidermal graft was then harvested.  The skin graft was then placed in the primary defect and oriented appropriately. Dermal Autograft Text: The defect edges were debeveled with a #15 scalpel blade. Given the location of the defect, shape of the defect and the proximity to free margins a dermal autograft was deemed most appropriate. Using a sterile surgical marker, the primary defect shape was transferred to the donor site. The area thus outlined was incised deep to adipose tissue with a #15 scalpel blade.  The harvested graft was then trimmed of adipose and epidermal tissue until only dermis was left.  The skin graft was then placed in the primary defect and oriented appropriately. Skin Substitute Text: The defect edges were debeveled with a #15 scalpel blade. Given the location of the defect, shape of the defect and the proximity to free margins a skin substitute graft was deemed most appropriate.  The graft material was trimmed to fit the size of the defect. The graft was then placed in the primary defect and oriented appropriately. Skin Substitute Paste Text: The defect edges were debeveled with a #15 scalpel blade. Given the location of the defect, shape of the defect and the proximity to free margins a skin substitute micronized graft was deemed most appropriate.  The entire vial contents were admixed with 0.5ccs of sterile saline, formed into a paste and then evenly spread over the entire wound bed. Skin Substitute Injection Text: The defect edges were debeveled with a #15 scalpel blade. Given the location of the defect, shape of the defect and the proximity to free margins a skin substitute micronized graft was deemed most appropriate.  The entire vial contents were admixed with 3.0ccs of sterile saline and then injected subcutaneously throughout the entire wound bed. Tissue Cultured Epidermal Autograft Text: The defect edges were debeveled with a #15 scalpel blade. Given the location of the defect, shape of the defect and the proximity to free margins a tissue cultured epidermal autograft was deemed most appropriate.  The graft was then trimmed to fit the size of the defect.  The graft was then placed in the primary defect and oriented appropriately. Xenograft Text: The defect edges were debeveled with a #15 scalpel blade. Given the location of the defect, shape of the defect and the proximity to free margins a xenograft was deemed most appropriate.  The graft was then trimmed to fit the size of the defect.  The graft was then placed in the primary defect and oriented appropriately. Purse String (Simple) Text: Given the location of the defect and the characteristics of the surrounding skin a purse string closure was deemed most appropriate.  Undermining was performed circumferentially around the surgical defect.  A purse string suture was then placed and tightened. Purse String (Intermediate) Text: Given the location of the defect and the characteristics of the surrounding skin a purse string intermediate closure was deemed most appropriate.  Undermining was performed circumferentially around the surgical defect.  A purse string suture was then placed and tightened. Partial Purse String (Simple) Text: Given the location of the defect and the characteristics of the surrounding skin a simple purse string closure was deemed most appropriate.  Undermining was performed circumferentially around the surgical defect.  A purse string suture was then placed and tightened. Wound tension only allowed a partial closure of the circular defect. Partial Purse String (Intermediate) Text: Given the location of the defect and the characteristics of the surrounding skin an intermediate purse string closure was deemed most appropriate.  Undermining was performed circumferentially around the surgical defect.  A purse string suture was then placed and tightened. Wound tension only allowed a partial closure of the circular defect. Localized Dermabrasion With Wire Brush Text: The patient was draped in routine manner.  Localized dermabrasion using 3 x 17 mm wire brush was performed in routine manner to papillary dermis. This spot dermabrasion is being performed to complete skin cancer reconstruction. It also will eliminate the other sun damaged precancerous cells that are known to be part of the regional effect of a lifetime's worth of sun exposure. This localized dermabrasion is therapeutic and should not be considered cosmetic in any regard. Localized Dermabrasion With Sand Papertext: The patient was draped in routine manner.  Localized dermabrasion using sterile sand paper was performed in routine manner to papillary dermis. This spot dermabrasion is being performed to complete skin cancer reconstruction. It also will eliminate the other sun damaged precancerous cells that are known to be part of the regional effect of a lifetime's worth of sun exposure. This localized dermabrasion is therapeutic and should not be considered cosmetic in any regard. Localized Dermabrasion With 15 Blade Text: The patient was draped in routine manner.  Localized dermabrasion using a 15 blade was performed in routine manner to papillary dermis. This spot dermabrasion is being performed to complete skin cancer reconstruction. It also will eliminate the other sun damaged precancerous cells that are known to be part of the regional effect of a lifetime's worth of sun exposure. This localized dermabrasion is therapeutic and should not be considered cosmetic in any regard. Tarsorrhaphy Text: A tarsorrhaphy was performed using Frost sutures. Intermediate Repair And Flap Additional Text (Will Appearing After The Standard Complex Repair Text): The intermediate repair was not sufficient to completely close the primary defect. The remaining additional defect was repaired with the flap mentioned below. Intermediate Repair And Graft Additional Text (Will Appearing After The Standard Complex Repair Text): The intermediate repair was not sufficient to completely close the primary defect. The remaining additional defect was repaired with the graft mentioned below. Complex Repair And Flap Additional Text (Will Appearing After The Standard Complex Repair Text): The complex repair was not sufficient to completely close the primary defect. The remaining additional defect was repaired with the flap mentioned below. Complex Repair And Graft Additional Text (Will Appearing After The Standard Complex Repair Text): The complex repair was not sufficient to completely close the primary defect. The remaining additional defect was repaired with the graft mentioned below. Eyelid Full Thickness Repair - 77194: The eyelid defect was full thickness which required a wedge repair of the eyelid. Special care was taken to ensure that the eyelid margin was realligned when placing sutures. Eyelid Partial Thickness Repair - 98206: The eyelid defect was partial thickness which required a wedge repair of the eyelid. Special care was taken to ensure that the eyelid margin was realligned when placing sutures. Cheek Interpolation Flap Division And Inset Text: Division and inset of the cheek interpolation flap was performed to achieve optimal aesthetic result, restore normal anatomic appearance and avoid distortion of normal anatomy, expedite and facilitate wound healing, achieve optimal functional result and because linear closure either not possible or would produce suboptimal result. The patient was prepped and draped in the usual manner. The pedicle was infiltrated with local anesthesia. The pedicle was sectioned with a #15 blade. The pedicle was de-bulked and trimmed to match the shape of the defect. Hemostasis was achieved. The flap donor site and free margin of the flap were secured with deep buried sutures and the wound edges were re-approximated. Cheek To Nose Interpolation Flap Division And Inset Text: Division and inset of the cheek to nose interpolation flap was performed to achieve optimal aesthetic result, restore normal anatomic appearance and avoid distortion of normal anatomy, expedite and facilitate wound healing, achieve optimal functional result and because linear closure either not possible or would produce suboptimal result. The patient was prepped and draped in the usual manner. The pedicle was infiltrated with local anesthesia. The pedicle was sectioned with a #15 blade. The pedicle was de-bulked and trimmed to match the shape of the defect. Hemostasis was achieved. The flap donor site and free margin of the flap were secured with deep buried sutures and the wound edges were re-approximated. Melolabial Interpolation Flap Division And Inset Text: Division and inset of the melolabial interpolation flap was performed to achieve optimal aesthetic result, restore normal anatomic appearance and avoid distortion of normal anatomy, expedite and facilitate wound healing, achieve optimal functional result and because linear closure either not possible or would produce suboptimal result. The patient was prepped and draped in the usual manner. The pedicle was infiltrated with local anesthesia. The pedicle was sectioned with a #15 blade. The pedicle was de-bulked and trimmed to match the shape of the defect. Hemostasis was achieved. The flap donor site and free margin of the flap were secured with deep buried sutures and the wound edges were re-approximated. Mastoid Interpolation Flap Division And Inset Text: Division and inset of the mastoid interpolation flap was performed to achieve optimal aesthetic result, restore normal anatomic appearance and avoid distortion of normal anatomy, expedite and facilitate wound healing, achieve optimal functional result and because linear closure either not possible or would produce suboptimal result. The patient was prepped and draped in the usual manner. The pedicle was infiltrated with local anesthesia. The pedicle was sectioned with a #15 blade. The pedicle was de-bulked and trimmed to match the shape of the defect. Hemostasis was achieved. The flap donor site and free margin of the flap were secured with deep buried sutures and the wound edges were re-approximated. Paramedian Forehead Flap Division And Inset Text: Division and inset of the paramedian forehead flap was performed to achieve optimal aesthetic result, restore normal anatomic appearance and avoid distortion of normal anatomy, expedite and facilitate wound healing, achieve optimal functional result and because linear closure either not possible or would produce suboptimal result. The patient was prepped and draped in the usual manner. The pedicle was infiltrated with local anesthesia. The pedicle was sectioned with a #15 blade. The pedicle was de-bulked and trimmed to match the shape of the defect. Hemostasis was achieved. The flap donor site and free margin of the flap were secured with deep buried sutures and the wound edges were re-approximated. Posterior Auricular Interpolation Flap Division And Inset Text: Division and inset of the posterior auricular interpolation flap was performed to achieve optimal aesthetic result, restore normal anatomic appearance and avoid distortion of normal anatomy, expedite and facilitate wound healing, achieve optimal functional result and because linear closure either not possible or would produce suboptimal result. The patient was prepped and draped in the usual manner. The pedicle was infiltrated with local anesthesia. The pedicle was sectioned with a #15 blade. The pedicle was de-bulked and trimmed to match the shape of the defect. Hemostasis was achieved. The flap donor site and free margin of the flap were secured with deep buried sutures and the wound edges were re-approximated. Manual Repair Warning Statement: We plan on removing the manually selected variable below in favor of our much easier automatic structured text blocks found in the previous tab. We decided to do this to help make the flow better and give you the full power of structured data. Manual selection is never going to be ideal in our platform and I would encourage you to avoid using manual selection from this point on, especially since I will be sunsetting this feature. It is important that you do one of two things with the customized text below. First, you can save all of the text in a word file so you can have it for future reference. Second, transfer the text to the appropriate area in the Library tab. Lastly, if there is a flap or graft type which we do not have you need to let us know right away so I can add it in before the variable is hidden. No need to panic, we plan to give you roughly 6 months to make the change. Consent: The rationale for the repair was explained to the patient and consent was obtained. The risks, benefits and alternatives to therapy were discussed in detail. Specifically, the risks of infection, scarring, bleeding, prolonged wound healing, incomplete removal, allergy to anesthesia, nerve injury and recurrence were addressed. Prior to the procedure, the treatment site was clearly identified and confirmed by the patient. All components of Universal Protocol/PAUSE Rule completed. Post-Care Instructions: I reviewed with the patient in detail post-care instructions. Patient is not to engage in any heavy lifting, exercise, or swimming for the next 7 days. Should the patient develop any fevers, chills, bleeding, severe pain patient will contact the office immediately. Pain Refusal Text: I offered to prescribe pain medication but the patient refused to take this medication. Show Asc Variables: Yes Where Do You Want The Question To Include Opioid Counseling Located?: Case Summary Tab Information: Selecting Yes will display possible errors in your note based on the variables you have selected. This validation is only offered as a suggestion for you. PLEASE NOTE THAT THE VALIDATION TEXT WILL BE REMOVED WHEN YOU FINALIZE YOUR NOTE. IF YOU WANT TO FAX A PRELIMINARY NOTE YOU WILL NEED TO TOGGLE THIS TO 'NO' IF YOU DO NOT WANT IT IN YOUR FAXED NOTE.

## 2024-10-21 NOTE — ED ADULT TRIAGE NOTE - RESPIRATORY RATE (BREATHS/MIN)
Detail Level: Detailed Number Of Freeze-Thaw Cycles: 1 freeze-thaw cycle Duration Of Freeze Thaw-Cycle (Seconds): 5 Post-Care Instructions: I reviewed with the patient in detail post-care instructions. Patient is to wear sunprotection, and avoid picking at any of the treated lesions. Pt may apply Vaseline to crusted or scabbing areas. Show Applicator Variable?: Yes Render Note In Bullet Format When Appropriate: No Application Tool (Optional): Liquid Nitrogen Sprayer Consent: The patient's consent was obtained including but not limited to risks of crusting, scabbing, blistering, scarring, darker or lighter pigmentary change, recurrence, incomplete removal and infection. 18

## 2025-07-09 ENCOUNTER — NON-APPOINTMENT (OUTPATIENT)
Age: 85
End: 2025-07-09

## 2025-07-09 ENCOUNTER — APPOINTMENT (OUTPATIENT)
Dept: CARDIOLOGY | Facility: CLINIC | Age: 85
End: 2025-07-09
Payer: MEDICARE

## 2025-07-09 VITALS
OXYGEN SATURATION: 96 % | WEIGHT: 148 LBS | DIASTOLIC BLOOD PRESSURE: 68 MMHG | HEIGHT: 63.6 IN | HEART RATE: 57 BPM | SYSTOLIC BLOOD PRESSURE: 205 MMHG | TEMPERATURE: 98.1 F | BODY MASS INDEX: 25.58 KG/M2

## 2025-07-09 VITALS — SYSTOLIC BLOOD PRESSURE: 215 MMHG | DIASTOLIC BLOOD PRESSURE: 68 MMHG

## 2025-07-09 PROBLEM — Z98.890 HISTORY OF ENDARTERECTOMY: Status: ACTIVE | Noted: 2025-07-09

## 2025-07-09 PROCEDURE — G2211 COMPLEX E/M VISIT ADD ON: CPT

## 2025-07-09 PROCEDURE — 93000 ELECTROCARDIOGRAM COMPLETE: CPT

## 2025-07-09 PROCEDURE — 99204 OFFICE O/P NEW MOD 45 MIN: CPT

## 2025-07-09 RX ORDER — LOSARTAN POTASSIUM 100 MG/1
100 TABLET, FILM COATED ORAL
Qty: 90 | Refills: 3 | Status: ACTIVE | COMMUNITY

## 2025-07-10 RX ORDER — PRAVASTATIN SODIUM 20 MG/1
20 TABLET ORAL DAILY
Qty: 90 | Refills: 3 | Status: ACTIVE | COMMUNITY
Start: 2025-07-10 | End: 1900-01-01

## 2025-08-13 ENCOUNTER — APPOINTMENT (OUTPATIENT)
Dept: CV DIAGNOSITCS | Facility: HOSPITAL | Age: 85
End: 2025-08-13

## 2025-08-13 ENCOUNTER — RESULT REVIEW (OUTPATIENT)
Age: 85
End: 2025-08-13

## 2025-08-13 ENCOUNTER — OUTPATIENT (OUTPATIENT)
Dept: OUTPATIENT SERVICES | Facility: HOSPITAL | Age: 85
LOS: 1 days | End: 2025-08-13
Payer: MEDICARE

## 2025-08-13 DIAGNOSIS — Z98.890 OTHER SPECIFIED POSTPROCEDURAL STATES: ICD-10-CM

## 2025-08-13 PROCEDURE — 93880 EXTRACRANIAL BILAT STUDY: CPT | Mod: 26

## 2025-08-13 PROCEDURE — 93925 LOWER EXTREMITY STUDY: CPT | Mod: 26

## 2025-08-13 PROCEDURE — 93922 UPR/L XTREMITY ART 2 LEVELS: CPT | Mod: 26,59

## 2025-09-08 ENCOUNTER — APPOINTMENT (OUTPATIENT)
Dept: CV DIAGNOSITCS | Facility: HOSPITAL | Age: 85
End: 2025-09-08

## 2025-09-08 ENCOUNTER — RESULT REVIEW (OUTPATIENT)
Age: 85
End: 2025-09-08